# Patient Record
Sex: MALE | Race: WHITE | NOT HISPANIC OR LATINO | Employment: OTHER | URBAN - METROPOLITAN AREA
[De-identification: names, ages, dates, MRNs, and addresses within clinical notes are randomized per-mention and may not be internally consistent; named-entity substitution may affect disease eponyms.]

---

## 2017-08-23 DIAGNOSIS — Z01.810 PRE-OPERATIVE CARDIOVASCULAR EXAMINATION: ICD-10-CM

## 2017-08-23 DIAGNOSIS — Z01.812 PRE-OPERATIVE LABORATORY EXAMINATION: ICD-10-CM

## 2017-08-23 LAB
ANION GAP SERPL CALC-SCNC: 8 MMOL/L (ref 0–11.9)
BUN SERPL-MCNC: 15 MG/DL (ref 8–22)
CALCIUM SERPL-MCNC: 10.1 MG/DL (ref 8.5–10.5)
CHLORIDE SERPL-SCNC: 106 MMOL/L (ref 96–112)
CO2 SERPL-SCNC: 26 MMOL/L (ref 20–33)
CREAT SERPL-MCNC: 0.96 MG/DL (ref 0.5–1.4)
EKG IMPRESSION: NORMAL
GFR SERPL CREATININE-BSD FRML MDRD: >60 ML/MIN/1.73 M 2
GLUCOSE SERPL-MCNC: 84 MG/DL (ref 65–99)
POTASSIUM SERPL-SCNC: 3.7 MMOL/L (ref 3.6–5.5)
SODIUM SERPL-SCNC: 140 MMOL/L (ref 135–145)

## 2017-08-23 PROCEDURE — 93005 ELECTROCARDIOGRAM TRACING: CPT

## 2017-08-23 PROCEDURE — 36415 COLL VENOUS BLD VENIPUNCTURE: CPT

## 2017-08-23 PROCEDURE — 80048 BASIC METABOLIC PNL TOTAL CA: CPT

## 2017-08-23 PROCEDURE — 93010 ELECTROCARDIOGRAM REPORT: CPT | Performed by: INTERNAL MEDICINE

## 2017-08-23 RX ORDER — ATORVASTATIN CALCIUM 10 MG/1
10 TABLET, FILM COATED ORAL DAILY
COMMUNITY

## 2017-08-23 RX ORDER — IBUPROFEN 800 MG/1
800 TABLET ORAL EVERY 8 HOURS PRN
COMMUNITY

## 2017-08-23 RX ORDER — PAROXETINE HYDROCHLORIDE 40 MG/1
40 TABLET, FILM COATED ORAL DAILY
COMMUNITY

## 2017-08-23 RX ORDER — AMLODIPINE BESYLATE 10 MG/1
10 TABLET ORAL DAILY
COMMUNITY

## 2017-08-23 RX ORDER — LISINOPRIL 40 MG/1
40 TABLET ORAL DAILY
COMMUNITY

## 2017-08-23 NOTE — OR NURSING
Pre admit appt: Pt instructed to continue regularly prescribed medications through day before surgery.Per anesthesia protocol pt instructed to take these medications with a sip of water the day of surgery- amlodipine and lipitor.

## 2017-08-24 ENCOUNTER — APPOINTMENT (OUTPATIENT)
Dept: RADIOLOGY | Facility: MEDICAL CENTER | Age: 52
End: 2017-08-24
Attending: ORTHOPAEDIC SURGERY
Payer: MEDICARE

## 2017-08-24 ENCOUNTER — HOSPITAL ENCOUNTER (OUTPATIENT)
Facility: MEDICAL CENTER | Age: 52
End: 2017-08-24
Attending: ORTHOPAEDIC SURGERY | Admitting: ORTHOPAEDIC SURGERY
Payer: MEDICARE

## 2017-08-24 VITALS
DIASTOLIC BLOOD PRESSURE: 76 MMHG | RESPIRATION RATE: 16 BRPM | SYSTOLIC BLOOD PRESSURE: 117 MMHG | BODY MASS INDEX: 29 KG/M2 | HEIGHT: 69 IN | OXYGEN SATURATION: 95 % | WEIGHT: 195.77 LBS | TEMPERATURE: 98.1 F | HEART RATE: 68 BPM

## 2017-08-24 PROBLEM — S62.626A CLOSED DISPLACED FRACTURE OF MIDDLE PHALANX OF RIGHT LITTLE FINGER: Status: ACTIVE | Noted: 2017-08-24

## 2017-08-24 PROCEDURE — 160036 HCHG PACU - EA ADDL 30 MINS PHASE I: Performed by: ORTHOPAEDIC SURGERY

## 2017-08-24 PROCEDURE — C1713 ANCHOR/SCREW BN/BN,TIS/BN: HCPCS | Performed by: ORTHOPAEDIC SURGERY

## 2017-08-24 PROCEDURE — 700102 HCHG RX REV CODE 250 W/ 637 OVERRIDE(OP): Performed by: ORTHOPAEDIC SURGERY

## 2017-08-24 PROCEDURE — 160002 HCHG RECOVERY MINUTES (STAT): Performed by: ORTHOPAEDIC SURGERY

## 2017-08-24 PROCEDURE — 160009 HCHG ANES TIME/MIN: Performed by: ORTHOPAEDIC SURGERY

## 2017-08-24 PROCEDURE — 700111 HCHG RX REV CODE 636 W/ 250 OVERRIDE (IP)

## 2017-08-24 PROCEDURE — 160035 HCHG PACU - 1ST 60 MINS PHASE I: Performed by: ORTHOPAEDIC SURGERY

## 2017-08-24 PROCEDURE — 160039 HCHG SURGERY MINUTES - EA ADDL 1 MIN LEVEL 3: Performed by: ORTHOPAEDIC SURGERY

## 2017-08-24 PROCEDURE — A9270 NON-COVERED ITEM OR SERVICE: HCPCS

## 2017-08-24 PROCEDURE — 700111 HCHG RX REV CODE 636 W/ 250 OVERRIDE (IP): Performed by: ORTHOPAEDIC SURGERY

## 2017-08-24 PROCEDURE — 502576 HCHG PACK, HAND: Performed by: ORTHOPAEDIC SURGERY

## 2017-08-24 PROCEDURE — 700101 HCHG RX REV CODE 250

## 2017-08-24 PROCEDURE — 700102 HCHG RX REV CODE 250 W/ 637 OVERRIDE(OP)

## 2017-08-24 PROCEDURE — 160046 HCHG PACU - 1ST 60 MINS PHASE II: Performed by: ORTHOPAEDIC SURGERY

## 2017-08-24 PROCEDURE — 160028 HCHG SURGERY MINUTES - 1ST 30 MINS LEVEL 3: Performed by: ORTHOPAEDIC SURGERY

## 2017-08-24 PROCEDURE — 160047 HCHG PACU  - EA ADDL 30 MINS PHASE II: Performed by: ORTHOPAEDIC SURGERY

## 2017-08-24 PROCEDURE — 500088 HCHG BLADE, SAGITTAL: Performed by: ORTHOPAEDIC SURGERY

## 2017-08-24 PROCEDURE — A9270 NON-COVERED ITEM OR SERVICE: HCPCS | Performed by: ORTHOPAEDIC SURGERY

## 2017-08-24 PROCEDURE — 160048 HCHG OR STATISTICAL LEVEL 1-5: Performed by: ORTHOPAEDIC SURGERY

## 2017-08-24 PROCEDURE — 501480 HCHG STOCKINETTE: Performed by: ORTHOPAEDIC SURGERY

## 2017-08-24 PROCEDURE — 160025 RECOVERY II MINUTES (STATS): Performed by: ORTHOPAEDIC SURGERY

## 2017-08-24 PROCEDURE — 501838 HCHG SUTURE GENERAL: Performed by: ORTHOPAEDIC SURGERY

## 2017-08-24 DEVICE — IMPLANTABLE DEVICE: Type: IMPLANTABLE DEVICE | Site: LITTLE FINGER | Status: FUNCTIONAL

## 2017-08-24 RX ORDER — DEXAMETHASONE SODIUM PHOSPHATE 4 MG/ML
4 INJECTION, SOLUTION INTRA-ARTICULAR; INTRALESIONAL; INTRAMUSCULAR; INTRAVENOUS; SOFT TISSUE
Status: DISCONTINUED | OUTPATIENT
Start: 2017-08-24 | End: 2017-08-24 | Stop reason: HOSPADM

## 2017-08-24 RX ORDER — BUPIVACAINE HYDROCHLORIDE AND EPINEPHRINE 5; 5 MG/ML; UG/ML
INJECTION, SOLUTION EPIDURAL; INTRACAUDAL; PERINEURAL
Status: DISCONTINUED | OUTPATIENT
Start: 2017-08-24 | End: 2017-08-24 | Stop reason: HOSPADM

## 2017-08-24 RX ORDER — OXYCODONE HCL 5 MG/5 ML
SOLUTION, ORAL ORAL
Status: COMPLETED
Start: 2017-08-24 | End: 2017-08-24

## 2017-08-24 RX ORDER — PHENTOLAMINE MESYLATE 5 MG/1
INJECTION INTRAMUSCULAR; INTRAVENOUS
Status: DISCONTINUED | OUTPATIENT
Start: 2017-08-24 | End: 2017-08-24 | Stop reason: HOSPADM

## 2017-08-24 RX ORDER — DIPHENHYDRAMINE HYDROCHLORIDE 50 MG/ML
25 INJECTION INTRAMUSCULAR; INTRAVENOUS EVERY 6 HOURS PRN
Status: DISCONTINUED | OUTPATIENT
Start: 2017-08-24 | End: 2017-08-24 | Stop reason: HOSPADM

## 2017-08-24 RX ORDER — BUPIVACAINE HYDROCHLORIDE 5 MG/ML
INJECTION, SOLUTION EPIDURAL; INTRACAUDAL
Status: DISCONTINUED | OUTPATIENT
Start: 2017-08-24 | End: 2017-08-24 | Stop reason: HOSPADM

## 2017-08-24 RX ORDER — SCOLOPAMINE TRANSDERMAL SYSTEM 1 MG/1
1 PATCH, EXTENDED RELEASE TRANSDERMAL
Status: DISCONTINUED | OUTPATIENT
Start: 2017-08-24 | End: 2017-08-24 | Stop reason: HOSPADM

## 2017-08-24 RX ORDER — HALOPERIDOL 5 MG/ML
1 INJECTION INTRAMUSCULAR EVERY 6 HOURS PRN
Status: DISCONTINUED | OUTPATIENT
Start: 2017-08-24 | End: 2017-08-24 | Stop reason: HOSPADM

## 2017-08-24 RX ORDER — SODIUM CHLORIDE, SODIUM LACTATE, POTASSIUM CHLORIDE, CALCIUM CHLORIDE 600; 310; 30; 20 MG/100ML; MG/100ML; MG/100ML; MG/100ML
1000 INJECTION, SOLUTION INTRAVENOUS
Status: COMPLETED | OUTPATIENT
Start: 2017-08-24 | End: 2017-08-24

## 2017-08-24 RX ORDER — OXYCODONE HYDROCHLORIDE 10 MG/1
10 TABLET ORAL
Status: DISCONTINUED | OUTPATIENT
Start: 2017-08-24 | End: 2017-08-24 | Stop reason: HOSPADM

## 2017-08-24 RX ORDER — ONDANSETRON 2 MG/ML
4 INJECTION INTRAMUSCULAR; INTRAVENOUS EVERY 4 HOURS PRN
Status: DISCONTINUED | OUTPATIENT
Start: 2017-08-24 | End: 2017-08-24 | Stop reason: HOSPADM

## 2017-08-24 RX ADMIN — SODIUM CHLORIDE, SODIUM LACTATE, POTASSIUM CHLORIDE, CALCIUM CHLORIDE 1000 ML: 600; 310; 30; 20 INJECTION, SOLUTION INTRAVENOUS at 11:18

## 2017-08-24 RX ADMIN — OXYCODONE HYDROCHLORIDE 10 MG: 5 SOLUTION ORAL at 17:05

## 2017-08-24 ASSESSMENT — PAIN SCALES - GENERAL
PAINLEVEL_OUTOF10: 5
PAINLEVEL_OUTOF10: 2
PAINLEVEL_OUTOF10: 0
PAINLEVEL_OUTOF10: 5
PAINLEVEL_OUTOF10: 0
PAINLEVEL_OUTOF10: 0
PAINLEVEL_OUTOF10: 2

## 2017-08-24 NOTE — IP AVS SNAPSHOT
8/24/2017    Rashad Finleyer Saul  [address] [place] [address]    Dear Rashad:    [facility] wants to ensure your discharge home is safe and you or your loved ones have had all of your questions answered regarding your care after you leave the hospital.    Below is a list of resources and contact information should you have any questions regarding your hospital stay, follow-up instructions, or active medical symptoms.    Questions or Concerns Regarding… Contact   Medical Questions Related to Your Discharge  (7 days a week, 8am-5pm) Contact a Nurse Care Coordinator   [phone]   Medical Questions Not Related to Your Discharge  (24 hours a day / 7 days a week)  Contact the Nurse Health Line   [phone]    Medications or Discharge Instructions Refer to your discharge packet   or contact your [facility] Primary Care Provider   [phone]   Follow-up Appointment(s) Schedule your appointment via [employer]   or contact Scheduling [phone]   Billing Review your statement via [employer]  or contact Billing [phone]   Medical Records Review your records via [employer]   or contact Medical Records [phone]     You may receive a telephone call within two days of discharge. This call is to make certain you understand your discharge instructions and have the opportunity to have any questions answered. You can also easily access your medical information, test results and upcoming appointments via the [employer] free online health management tool. You can learn more and sign up at [employer]Oct[employer]. For assistance setting up your [employer] account, please call [phone].    Once again, we want to ensure your discharge home is safe and that you have a clear understanding of any next steps in your care. If you have any questions or concerns, please do not hesitate to contact us, we are here for you. Thank you for choosing [facility] for your healthcare needs.    Sincerely,    Your [facility] Healthcare Team

## 2017-08-24 NOTE — OR SURGEON
Operative Report    PreOp Diagnosis: R sf fracture dislocation pipj    PostOp Diagnosis: same    Procedure(s):  FINGER ARTHROPLASTY BOO, HAMATE, SMALL    Surgeon(s):  Milan Ventura M.D.    Anesthesiologist/Type of Anesthesia:  Anesthesiologist: Lorne Pires M.D./* No anesthesia type entered *    Surgical Staff:  Circulator: Donis Mcallister R.N.  Scrub Person: Edgardo Joaquin    Specimens:  * No specimens in log *    Estimated Blood Loss: min    Findings: comminuted fracture    Complications: none        8/24/2017 12:22 PM Milan Ventura

## 2017-08-24 NOTE — OR NURSING
1522  To PACU from OR via gurney, sleeping, respirations spontaneous and non-labored via OPA. Icepack applied over c/d/i right wrist surgical dressings. Fingers warm, however pinky finger appears dusky.  RUE elevated on pillows. Plan to keep pt in PACU for full hour per STOPBANG protocol. VSS   1535 Dr. Ventura at bedside, noted that pinky finger is dusky, but warm. No orders received.   1545 No change   1550 Dr. Ventura at bedside to remove pt dressing   1552 OPA dc's at this time   1600 Dr. Ventura at bedside to redress pts hand. VSS Pt remains drowsy   1615 Pts pinky to RUE remains dusky, VSS. Pt remains drowsy.   1630 No change   1645 Pt more awake and alert at this time. VSS. Pt states right hand is throbbing and is 5/10 pain. Plan to medicate. See MAR   1700 No change   1717  Pt meets criteria to transfer to stage two. VSS. States pain is tolerable at this time. Denies nausea.

## 2017-08-24 NOTE — IP AVS SNAPSHOT
" Home Care Instructions                                                                                                                Name:Rashad Hughes  Medical Record Number:[id number]  CSN: [id number]    YOB: 1965   Age: 52 y.o.  Sex: male  HT:1.753 m (5' 9.02\") WT: 88.8 kg (195 lb 12.3 oz)          Admit Date: 8/24/2017     Discharge Date:   Today's Date: 8/24/2017  Attending Doctor:  Milan Ventura M.D.                  Allergies:  Shellfish allergy              Follow-up Information     1. Follow up with Milan Ventura M.D..    Specialty:  Orthopaedics    Why:  10-14 days    Contact information    [address]  [address]  [facility] [address]  [phone]          2. Please follow up.    Why:  occupational therapy monday 8/28 call [phone] for apt        Discharge Instructions         ACTIVITY: Rest and take it easy for the first 24 hours.  A responsible adult is recommended to remain with you during that time.  It is normal to feel sleepy.  We encourage you to not do anything that requires balance, judgment or coordination.    MILD FLU-LIKE SYMPTOMS ARE NORMAL. YOU MAY EXPERIENCE GENERALIZED MUSCLE ACHES, THROAT IRRITATION, HEADACHE AND/OR SOME NAUSEA.    FOR 24 HOURS DO NOT:  Drive, operate machinery or run household appliances.  Drink beer or alcoholic beverages.   Make important decisions or sign legal documents.    SPECIAL INSTRUCTIONS:  Please call Dr. Ventura's office in morning. Dr. Ventura would like to see finger in the a.m.   Keep dressing clean and dry  Elevate extremity  Keep dressing on until next appointment  Encourage moving all fingers  May shower tomorrow with dressing covered     DIET: To avoid nausea, slowly advance diet as tolerated, avoiding spicy or greasy foods for the first day.  Add more substantial food to your diet according to your physician's instructions.  INCREASE FLUIDS AND FIBER TO AVOID CONSTIPATION.        FOLLOW-UP APPOINTMENT:  A follow-up appointment should be " arranged with your doctor in office as instructed; call to schedule.    You should CALL YOUR PHYSICIAN if you develop:  Fever greater than 101 degrees F.  Pain not relieved by medication, or persistent nausea or vomiting.  Excessive bleeding (blood soaking through dressing) or unexpected drainage from the wound.  Extreme redness or swelling around the incision site, drainage of pus or foul smelling drainage.  Inability to urinate or empty your bladder within 8 hours.  Problems with breathing or chest pain.    You should call 911 if you develop problems with breathing or chest pain.  If you are unable to contact your doctor or surgical center, you should go to the nearest emergency room or urgent care center.  Dr Ventura's telephone #: [phone]    If any questions arise, call your doctor.  If your doctor is not available, please feel free to call the Surgical Center at [phone].  The Center is open Monday through Friday from 7AM to 7PM.  You can also call the [employer] HOTLINE open 24 hours/day, 7 days/week and speak to a nurse at [phone], or toll free at [phone].    A registered nurse may call you a few days after your surgery to see how you are doing after your procedure.    MEDICATIONS: Resume taking daily medication.  Take prescribed pain medication with food.  If no medication is prescribed, you may take non-aspirin pain medication if needed.  PAIN MEDICATION CAN BE VERY CONSTIPATING.  Take a stool softener or laxative such as senokot, pericolace, or milk of magnesia if needed.    Prescription given for Oxycodone .  Last pain medication given at 5:00 PM 10 mg oxycodone .    If your physician has prescribed pain medication that includes Acetaminophen (Tylenol), do not take additional Acetaminophen (Tylenol) while taking the prescribed medication.    Depression / Suicide Risk    As you are discharged from this [facility] facility, it is important to learn how to keep safe from harming  yourself.    Recognize the warning signs:  · Abrupt changes in personality, positive or negative- including increase in energy   · Giving away possessions  · Change in eating patterns- significant weight changes-  positive or negative  · Change in sleeping patterns- unable to sleep or sleeping all the time   · Unwillingness or inability to communicate  · Depression  · Unusual sadness, discouragement and loneliness  · Talk of wanting to die  · Neglect of personal appearance   · Rebelliousness- reckless behavior  · Withdrawal from people/activities they love  · Confusion- inability to concentrate     If you or a loved one observes any of these behaviors or has concerns about self-harm, here's what you can do:  · Talk about it- your feelings and reasons for harming yourself  · Remove any means that you might use to hurt yourself (examples: pills, rope, extension cords, firearm)  · Get professional help from the community (Mental Health, Substance Abuse, psychological counseling)  · Do not be alone:Call your Safe Contact- someone whom you trust who will be there for you.  · Call your local CRISIS HOTLINE 406-4439 or 616-909-1639  · Call your local Children's Mobile Crisis Response Team [place] (043) 832-8869 or www.[employer]  · Call the toll free National Suicide Prevention Hotlines   · National Suicide Prevention Lifeline 389-942-VKNP (9021)  National Hope Line Network 800-SUICIDE (823-8124)    Discharge Education for patients on JOSE (Obstructive Sleep Apnea) Protocol    We recommend that you should be with an adult observer for at least 24 hours after your sedation/anesthesia.  If you have a CPAP machine, you should wear it during any sleep period (day or night) for the week following your procedure.  We encourage you to sleep on your side or in a sitting position, even with napping.  Lying flat on your back increases the risk of apnea and airway obstruction during your post procedure recovery  period.    It is important to prevent over-sedation that could increase your risk for apnea.  Please take all pain medication as directed by your physician.  If you are not getting pain relief, please contact your physician to discuss possible approaches to relieving pain while minimizing medications that can affect your breathing and oxygen levels.         Medication List      CONTINUE taking these medications        Instructions    Morning Afternoon Evening Bedtime    amlodipine 10 MG Tabs   Commonly known as:  NORVASC        Take 10 mg by mouth every day.   Dose:  10 mg                        aspirin 81 MG tablet        Take 81 mg by mouth every day.   Dose:  81 mg                        atorvastatin 10 MG Tabs   Commonly known as:  LIPITOR        Take 10 mg by mouth every day.   Dose:  10 mg                        Cholecalciferol 31586 units Tabs        Take 10,000 Units by mouth every day.   Dose:  50163 Units                        ibuprofen 800 MG Tabs   Commonly known as:  MOTRIN        Take 800 mg by mouth every 8 hours as needed.   Dose:  800 mg                        lisinopril 40 MG tablet   Commonly known as:  PRINIVIL, ZESTRIL        Take 40 mg by mouth every day.   Dose:  40 mg                        paroxetine 40 MG tablet   Commonly known as:  PAXIL        Take 40 mg by mouth every day.   Dose:  40 mg                                Medication Information     Above and/or attached are the medications your physician expects you to take upon discharge. Review all of your home medications and newly ordered medications with your doctor and/or pharmacist. Follow medication instructions as directed by your doctor and/or pharmacist. Please keep your medication list with you and share with your physician. Update the information when medications are discontinued, doses are changed, or new medications (including over-the-counter products) are added; and carry medication information at all times in the event of  emergency situations.        Resources     Quit Smoking / Tobacco Use:    I understand the use of any tobacco products increases my chance of suffering from future heart disease or stroke and could cause other illnesses which may shorten my life. Quitting the use of tobacco products is the single most important thing I can do to improve my health. For further information on smoking / tobacco cessation call a Toll Free Quit Line at 1-382.394.2546 (*National Cancer [place]) or 1-878.888.7880 (American Lung Association) or you can access the web based program at www.lungusa.org.    [place] Tobacco Users Help Line:  (851) 574-3117       Toll Free: 1-781.882.8779  Quit Tobacco Program [facility] (730)937-9280    Crisis Hotline:    [place] Crisis Hotline:  2-977-AOOQDWR or 1-356.805.7300    [place] Crisis Hotline:    1-505.706.2009 or 392-540-4837    Discharge Survey:   Thank you for choosing [facility]. We hope we did everything we could to make your hospital stay a pleasant one. You may be receiving a survey and we would appreciate your time and participation in answering the questions. Your input is very valuable to us in our efforts to improve our service to our patients and their families.            Signatures     My signature on this form indicates that:    1. I acknowledge receipt and understanding of these Home Care Instruction.  2. My questions regarding this information have been answered to my satisfaction.  3. I have formulated a plan with my discharge nurse to obtain my prescribed medications for home.    __________________________________      __________________________________                   Patient Signature                                 Guardian/Responsible Adult Signature      __________________________________                 __________       ________                       Nurse Signature                                               Date                 Time

## 2017-08-25 NOTE — DISCHARGE INSTRUCTIONS
ACTIVITY: Rest and take it easy for the first 24 hours.  A responsible adult is recommended to remain with you during that time.  It is normal to feel sleepy.  We encourage you to not do anything that requires balance, judgment or coordination.    MILD FLU-LIKE SYMPTOMS ARE NORMAL. YOU MAY EXPERIENCE GENERALIZED MUSCLE ACHES, THROAT IRRITATION, HEADACHE AND/OR SOME NAUSEA.    FOR 24 HOURS DO NOT:  Drive, operate machinery or run household appliances.  Drink beer or alcoholic beverages.   Make important decisions or sign legal documents.    SPECIAL INSTRUCTIONS:  Please call Dr. Ventura's office in morning. Dr. Ventura would like to see finger in the a.m.   Keep dressing clean and dry  Elevate extremity  Keep dressing on until next appointment  Encourage moving all fingers  May shower tomorrow with dressing covered     DIET: To avoid nausea, slowly advance diet as tolerated, avoiding spicy or greasy foods for the first day.  Add more substantial food to your diet according to your physician's instructions.  INCREASE FLUIDS AND FIBER TO AVOID CONSTIPATION.        FOLLOW-UP APPOINTMENT:  A follow-up appointment should be arranged with your doctor in office as instructed; call to schedule.    You should CALL YOUR PHYSICIAN if you develop:  Fever greater than 101 degrees F.  Pain not relieved by medication, or persistent nausea or vomiting.  Excessive bleeding (blood soaking through dressing) or unexpected drainage from the wound.  Extreme redness or swelling around the incision site, drainage of pus or foul smelling drainage.  Inability to urinate or empty your bladder within 8 hours.  Problems with breathing or chest pain.    You should call 911 if you develop problems with breathing or chest pain.  If you are unable to contact your doctor or surgical center, you should go to the nearest emergency room or urgent care center.  Dr Ventura's telephone #: [phone]    If any questions arise, call your doctor.  If your doctor  is not available, please feel free to call the Surgical Center at [phone].  The Center is open Monday through Friday from 7AM to 7PM.  You can also call the HEALTH HOTLINE open 24 hours/day, 7 days/week and speak to a nurse at [phone], or toll free at [phone].    A registered nurse may call you a few days after your surgery to see how you are doing after your procedure.    MEDICATIONS: Resume taking daily medication.  Take prescribed pain medication with food.  If no medication is prescribed, you may take non-aspirin pain medication if needed.  PAIN MEDICATION CAN BE VERY CONSTIPATING.  Take a stool softener or laxative such as senokot, pericolace, or milk of magnesia if needed.    Prescription given for Oxycodone .  Last pain medication given at 5:00 PM 10 mg oxycodone .    If your physician has prescribed pain medication that includes Acetaminophen (Tylenol), do not take additional Acetaminophen (Tylenol) while taking the prescribed medication.    Depression / Suicide Risk    As you are discharged from this [facility] facility, it is important to learn how to keep safe from harming yourself.    Recognize the warning signs:  · Abrupt changes in personality, positive or negative- including increase in energy   · Giving away possessions  · Change in eating patterns- significant weight changes-  positive or negative  · Change in sleeping patterns- unable to sleep or sleeping all the time   · Unwillingness or inability to communicate  · Depression  · Unusual sadness, discouragement and loneliness  · Talk of wanting to die  · Neglect of personal appearance   · Rebelliousness- reckless behavior  · Withdrawal from people/activities they love  · Confusion- inability to concentrate     If you or a loved one observes any of these behaviors or has concerns about self-harm, here's what you can do:  · Talk about it- your feelings and reasons for harming yourself  · Remove any means that you might use to  hurt yourself (examples: pills, rope, extension cords, firearm)  · Get professional help from the community (Mental Health, Substance Abuse, psychological counseling)  · Do not be alone:Call your Safe Contact- someone whom you trust who will be there for you.  · Call your local CRISIS HOTLINE 606-0009 or 330-171-8006  · Call your local Children's Mobile Crisis Response Team [place] (709) 987-6393 or www.[employer]  · Call the toll free National Suicide Prevention Hotlines   · National Suicide Prevention Lifeline 082-722-BMQE (1505)  [place] Hope Line Network 800-SUICIDE (043-5645)    Discharge Education for patients on JOSE (Obstructive Sleep Apnea) Protocol    We recommend that you should be with an adult observer for at least 24 hours after your sedation/anesthesia.  If you have a CPAP machine, you should wear it during any sleep period (day or night) for the week following your procedure.  We encourage you to sleep on your side or in a sitting position, even with napping.  Lying flat on your back increases the risk of apnea and airway obstruction during your post procedure recovery period.    It is important to prevent over-sedation that could increase your risk for apnea.  Please take all pain medication as directed by your physician.  If you are not getting pain relief, please contact your physician to discuss possible approaches to relieving pain while minimizing medications that can affect your breathing and oxygen levels.

## 2017-08-25 NOTE — OR NURSING
1717: Settled in recliner post short ambulation from [facility].  1800: D/Wood to care of family post uneventful stay in PACU 2.
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
## 2017-09-11 ENCOUNTER — APPOINTMENT (OUTPATIENT)
Dept: ADMISSIONS | Facility: MEDICAL CENTER | Age: 52
End: 2017-09-11
Attending: ORTHOPAEDIC SURGERY
Payer: MEDICARE

## 2018-03-28 DIAGNOSIS — Z01.810 PRE-OPERATIVE CARDIOVASCULAR EXAMINATION: ICD-10-CM

## 2018-03-28 DIAGNOSIS — Z01.812 PRE-OPERATIVE LABORATORY EXAMINATION: ICD-10-CM

## 2018-03-28 LAB
ANION GAP SERPL CALC-SCNC: 9 MMOL/L (ref 0–11.9)
BUN SERPL-MCNC: 16 MG/DL (ref 8–22)
CALCIUM SERPL-MCNC: 10 MG/DL (ref 8.5–10.5)
CHLORIDE SERPL-SCNC: 106 MMOL/L (ref 96–112)
CO2 SERPL-SCNC: 22 MMOL/L (ref 20–33)
CREAT SERPL-MCNC: 0.65 MG/DL (ref 0.5–1.4)
EKG IMPRESSION: NORMAL
ERYTHROCYTE [DISTWIDTH] IN BLOOD BY AUTOMATED COUNT: 42.4 FL (ref 35.9–50)
GLUCOSE SERPL-MCNC: 86 MG/DL (ref 65–99)
HCT VFR BLD AUTO: 46.2 % (ref 42–52)
HGB BLD-MCNC: 15.8 G/DL (ref 14–18)
MCH RBC QN AUTO: 30.6 PG (ref 27–33)
MCHC RBC AUTO-ENTMCNC: 34.2 G/DL (ref 33.7–35.3)
MCV RBC AUTO: 89.5 FL (ref 81.4–97.8)
PLATELET # BLD AUTO: 259 K/UL (ref 164–446)
PMV BLD AUTO: 12.2 FL (ref 9–12.9)
POTASSIUM SERPL-SCNC: 3.9 MMOL/L (ref 3.6–5.5)
RBC # BLD AUTO: 5.16 M/UL (ref 4.7–6.1)
SCCMEC + MECA PNL NOSE NAA+PROBE: NEGATIVE
SCCMEC + MECA PNL NOSE NAA+PROBE: NEGATIVE
SODIUM SERPL-SCNC: 137 MMOL/L (ref 135–145)
WBC # BLD AUTO: 6 K/UL (ref 4.8–10.8)

## 2018-03-28 PROCEDURE — 85027 COMPLETE CBC AUTOMATED: CPT

## 2018-03-28 PROCEDURE — 87640 STAPH A DNA AMP PROBE: CPT | Mod: 59

## 2018-03-28 PROCEDURE — 36415 COLL VENOUS BLD VENIPUNCTURE: CPT

## 2018-03-28 PROCEDURE — 93005 ELECTROCARDIOGRAM TRACING: CPT

## 2018-03-28 PROCEDURE — 80048 BASIC METABOLIC PNL TOTAL CA: CPT

## 2018-03-28 PROCEDURE — 93010 ELECTROCARDIOGRAM REPORT: CPT | Performed by: INTERNAL MEDICINE

## 2018-03-28 PROCEDURE — 87641 MR-STAPH DNA AMP PROBE: CPT

## 2018-03-28 NOTE — DISCHARGE PLANNING
DISCHARGE PLANNING NOTE - TOTAL JOINT     Procedure: Procedure(s):  KNEE ARTHROPLASTY TOTAL - PATELLOFEMORAL  Procedure Date: 4/12/2018  Insurance:  Payor: MEDICARE / Plan: MEDICARE PART A & B / Product Type: *No Product type* /   Equipment currently available at home? raised toilet seat and shower chair  Steps into the home? 0  Steps within the home? 0  Toilet height? Standard  Type of shower? walk-in shower  Who will be with you during your recovery? friend  Is Outpatient Physical Therapy set up after surgery? Yes   Did you take the Total Joint Class and where? Yes, at [facility]     Plan:

## 2018-03-28 NOTE — OR NURSING
Pre admit apt: Pt. Instructed to continue regularly prescribed medications through day before surgery.  Instructed to take the following medications, the day of surgery, with a sip of water per anesthesia protocol:amlodipine, lipitor    Pt denies any sxs of UTI

## 2018-04-12 ENCOUNTER — HOSPITAL ENCOUNTER (OUTPATIENT)
Facility: MEDICAL CENTER | Age: 53
End: 2018-04-12
Attending: ORTHOPAEDIC SURGERY | Admitting: ORTHOPAEDIC SURGERY
Payer: MEDICARE

## 2018-04-12 ENCOUNTER — APPOINTMENT (OUTPATIENT)
Dept: RADIOLOGY | Facility: MEDICAL CENTER | Age: 53
End: 2018-04-12
Attending: PHYSICIAN ASSISTANT
Payer: MEDICARE

## 2018-04-12 VITALS
TEMPERATURE: 98.6 F | RESPIRATION RATE: 18 BRPM | WEIGHT: 189.6 LBS | DIASTOLIC BLOOD PRESSURE: 97 MMHG | HEIGHT: 69 IN | SYSTOLIC BLOOD PRESSURE: 139 MMHG | BODY MASS INDEX: 28.08 KG/M2 | HEART RATE: 103 BPM | OXYGEN SATURATION: 97 %

## 2018-04-12 PROCEDURE — 700111 HCHG RX REV CODE 636 W/ 250 OVERRIDE (IP)

## 2018-04-12 PROCEDURE — 160009 HCHG ANES TIME/MIN: Performed by: ORTHOPAEDIC SURGERY

## 2018-04-12 PROCEDURE — 502579 HCHG PACK, TOTAL KNEE: Performed by: ORTHOPAEDIC SURGERY

## 2018-04-12 PROCEDURE — A9270 NON-COVERED ITEM OR SERVICE: HCPCS

## 2018-04-12 PROCEDURE — 160041 HCHG SURGERY MINUTES - EA ADDL 1 MIN LEVEL 4: Performed by: ORTHOPAEDIC SURGERY

## 2018-04-12 PROCEDURE — 160046 HCHG PACU - 1ST 60 MINS PHASE II: Performed by: ORTHOPAEDIC SURGERY

## 2018-04-12 PROCEDURE — 160029 HCHG SURGERY MINUTES - 1ST 30 MINS LEVEL 4: Performed by: ORTHOPAEDIC SURGERY

## 2018-04-12 PROCEDURE — 700101 HCHG RX REV CODE 250

## 2018-04-12 PROCEDURE — 160022 HCHG BLOCK: Performed by: ORTHOPAEDIC SURGERY

## 2018-04-12 PROCEDURE — 501838 HCHG SUTURE GENERAL: Performed by: ORTHOPAEDIC SURGERY

## 2018-04-12 PROCEDURE — 700102 HCHG RX REV CODE 250 W/ 637 OVERRIDE(OP)

## 2018-04-12 PROCEDURE — L8699 PROSTHETIC IMPLANT NOS: HCPCS | Performed by: ORTHOPAEDIC SURGERY

## 2018-04-12 PROCEDURE — 160048 HCHG OR STATISTICAL LEVEL 1-5: Performed by: ORTHOPAEDIC SURGERY

## 2018-04-12 PROCEDURE — 160047 HCHG PACU  - EA ADDL 30 MINS PHASE II: Performed by: ORTHOPAEDIC SURGERY

## 2018-04-12 PROCEDURE — 502000 HCHG MISC OR IMPLANTS RC 0278: Performed by: ORTHOPAEDIC SURGERY

## 2018-04-12 PROCEDURE — 160002 HCHG RECOVERY MINUTES (STAT): Performed by: ORTHOPAEDIC SURGERY

## 2018-04-12 PROCEDURE — 160035 HCHG PACU - 1ST 60 MINS PHASE I: Performed by: ORTHOPAEDIC SURGERY

## 2018-04-12 PROCEDURE — 160025 RECOVERY II MINUTES (STATS): Performed by: ORTHOPAEDIC SURGERY

## 2018-04-12 PROCEDURE — 160036 HCHG PACU - EA ADDL 30 MINS PHASE I: Performed by: ORTHOPAEDIC SURGERY

## 2018-04-12 PROCEDURE — 73560 X-RAY EXAM OF KNEE 1 OR 2: CPT | Mod: LT

## 2018-04-12 PROCEDURE — 700105 HCHG RX REV CODE 258: Performed by: ORTHOPAEDIC SURGERY

## 2018-04-12 DEVICE — BONE CEMENT SIMPLEX ANTIBIO - (10/PK): Type: IMPLANTABLE DEVICE | Site: KNEE | Status: FUNCTIONAL

## 2018-04-12 DEVICE — IMPLANTABLE DEVICE: Type: IMPLANTABLE DEVICE | Site: KNEE | Status: FUNCTIONAL

## 2018-04-12 RX ORDER — LIDOCAINE HYDROCHLORIDE 10 MG/ML
INJECTION, SOLUTION INFILTRATION; PERINEURAL
Status: COMPLETED
Start: 2018-04-12 | End: 2018-04-12

## 2018-04-12 RX ORDER — SODIUM CHLORIDE, SODIUM LACTATE, POTASSIUM CHLORIDE, CALCIUM CHLORIDE 600; 310; 30; 20 MG/100ML; MG/100ML; MG/100ML; MG/100ML
INJECTION, SOLUTION INTRAVENOUS
Status: DISCONTINUED | OUTPATIENT
Start: 2018-04-12 | End: 2018-04-12 | Stop reason: HOSPADM

## 2018-04-12 RX ORDER — LABETALOL HYDROCHLORIDE 5 MG/ML
INJECTION, SOLUTION INTRAVENOUS
Status: COMPLETED
Start: 2018-04-12 | End: 2018-04-12

## 2018-04-12 RX ORDER — OXYCODONE HCL 5 MG/5 ML
SOLUTION, ORAL ORAL
Status: COMPLETED
Start: 2018-04-12 | End: 2018-04-12

## 2018-04-12 RX ADMIN — FENTANYL CITRATE 50 MCG: 50 INJECTION, SOLUTION INTRAMUSCULAR; INTRAVENOUS at 14:14

## 2018-04-12 RX ADMIN — FENTANYL CITRATE 25 MCG: 50 INJECTION, SOLUTION INTRAMUSCULAR; INTRAVENOUS at 14:49

## 2018-04-12 RX ADMIN — LABETALOL HYDROCHLORIDE 5 MG: 5 INJECTION, SOLUTION INTRAVENOUS at 14:45

## 2018-04-12 RX ADMIN — SODIUM CHLORIDE, POTASSIUM CHLORIDE, SODIUM LACTATE AND CALCIUM CHLORIDE: 600; 310; 30; 20 INJECTION, SOLUTION INTRAVENOUS at 11:00

## 2018-04-12 RX ADMIN — FENTANYL CITRATE 25 MCG: 50 INJECTION, SOLUTION INTRAMUSCULAR; INTRAVENOUS at 15:27

## 2018-04-12 RX ADMIN — OXYCODONE HYDROCHLORIDE 10 MG: 5 SOLUTION ORAL at 14:12

## 2018-04-12 RX ADMIN — LIDOCAINE HYDROCHLORIDE 0.2 ML: 10 INJECTION, SOLUTION INFILTRATION; PERINEURAL at 11:00

## 2018-04-12 RX ADMIN — FENTANYL CITRATE 25 MCG: 50 INJECTION, SOLUTION INTRAMUSCULAR; INTRAVENOUS at 15:22

## 2018-04-12 RX ADMIN — FENTANYL CITRATE 25 MCG: 50 INJECTION, SOLUTION INTRAMUSCULAR; INTRAVENOUS at 14:56

## 2018-04-12 ASSESSMENT — PAIN SCALES - GENERAL
PAINLEVEL_OUTOF10: 4
PAINLEVEL_OUTOF10: 7
PAINLEVEL_OUTOF10: 8
PAINLEVEL_OUTOF10: ASSUMED PAIN PRESENT
PAINLEVEL_OUTOF10: 4
PAINLEVEL_OUTOF10: ASSUMED PAIN PRESENT
PAINLEVEL_OUTOF10: 4

## 2018-04-12 NOTE — OP REPORT
DATE OF SERVICE:  04/12/2018    PREOPERATIVE DIAGNOSES:  Left knee degenerative joint disease and extensive   scarring of the retropatellar fat pad.    POSTOPERATIVE DIAGNOSES:  1.  Left knee degenerative joint disease of the patellofemoral joint.  2.  Extensive scarring of the retropatellar fat pad and the retinacula.  3.  Lateral maltracking of the patella and additional procedure of open   lateral release.    PROCEDURES PERFORMED:  1.  Left knee patellofemoral osteoplasty.  2.  Extensive scar excision from the anterior aspect of the knee.    SURGEON:  Jarred Rene MD    ASSISTANT:  Bev Escobar PA-C    ANESTHESIA:  General and an adductor canal nerve block.    ANESTHESIOLOGIST:  Bharath Salazar MD    IMPLANTS:  [employer] Journey patellofemoral joint Oxinium medium   trochlear implant and a 35x7.5 mm thickness Loren II resurfacing round   patellar component.    TOTAL TOURNIQUET TIME:  Approximately 45 minutes.    COMPLICATIONS:  None.    DISPOSITION:  Stable to postanesthesia care unit.    INDICATIONS:  The patient is a very pleasant gentleman who has had multiple   procedures on the left knee over the years.  He has had significant scarring   and advanced patellofemoral degenerative changes.  The risks, benefits,   alternatives, and limitations of the patellofemoral arthroplasty and other   procedures indicated were discussed in detail.  He expressed understanding and   desired to proceed.    DESCRIPTION OF PROCEDURE:  The patient and the correct operative extremity   were identified in the preoperative area.  The knee was marked.  He was   brought to the operating room and the correct operative extremity was again   confirmed.  He was placed supine on the OR table where he underwent an   adductor canal nerve block performed at my request for postoperative pain   control.  He underwent general anesthesia without complication.  Examination   under anesthesia showed diminished patellofemoral mobility,  but otherwise no   abnormalities.  Full range of motion, no instability.  The knee was prepped   and draped in the usual sterile fashion using ChloraPrep and Esmarch used to   exsanguinate the left lower extremity and tourniquet inflated to 250 mmHg.  A   10 cm longitudinal incision was centered over the patellofemoral joint.  Sharp   dissection carried down to the level of the paratenon and medial parapatellar   arthrotomy was performed, taking care to avoid incising the inner meniscal   ligament inferiorly and then incised into the synovial layer.  There was   extensive scarring around the retropatellar fat pad, just thick scar there,   developed a plane between the patellar tendon and the scar and with a curved   Han scissors developed that plane and then excised the scar from the   retropatellar fat pad, then mobilized more scar laterally along the   retinaculum, eventually performed an open lateral release from the inside of   the knee.  I then evaluated the articular cartilage.  There are extensive   grade IV changes on the trochlear side.  The patellar side still had some   intact cartilage, but it was significantly thin.  I elected to proceed with   patellofemoral arthroplasty as the tibial femoral joints were intact, the ACL   and PCL were intact, and placed the intramedullary guide lined up the proposed   resection with the intercondylar axis.  I then performed anterior femoral   resection, then just used a knife to remove the cartilage from the trochlea   and a curette and then used a small joce to bur that down until the trial fit   perfectly flush with the remaining articular cartilage.  We then drilled for   the lugs for the real component and then placed a trial in position, again had   perfect transition between the trial and the articular cartilage of both the   medial femoral condyle and lateral femoral condyle.  I then everted the   patella, excised more scar from around the patella, then  measured the patella   about 25 mm and performed a freehand patellar resection and then placed a 35   mm round patellar button, had central tracking, measured 26 mm, so we elected   to go with thinner poly of 2 mm so that would make the polyethylene and   patellar thickness 24 mm.  We then removed the trials, vacuum irrigated and   dried the bony surfaces while the cement was vacuum mixed.  We then cemented   the trochlear component followed by the patella using third generation   technique.  We allowed the cement to cure completely while bathing in a   Betadine solution.  We removed all excess cement, again copiously irrigated   the wound, then deflated the tourniquet, obtained hemostasis.  There was   minimal bleeding.  We then again irrigated the wound, took the knee through   full range of motion.  There was central patellar tracking, no significant   step-offs or jumping as the patella was taken through the full range of motion   and then flexed the knee to about 45 degrees, closed the extensor mechanism   with combination of interrupted #1 Vicryl suture and a running #2 Quill stitch   and again copiously irrigated the wound, closed the deep subcutaneous with   2-0 Monocryl, closed the skin with staples.  Sterile dressings were applied.    The patient's knee was loosely overwrapped with an Ace wrap.  SERGIO stocking was   placed.  The patient was then allowed to awake from anesthesia, transferred   to his hospital cart, and taken to postanesthesia care unit in stable   condition.  He tolerated the procedure well.  There were no immediate   complications.       ____________________________________     SPENCER MICHELE MD RD / ANALIA    DD:  04/12/2018 13:49:25  DT:  04/12/2018 14:09:46    D#:  [id number]  Job#:  [id number]

## 2018-04-12 NOTE — DISCHARGE INSTRUCTIONS
ACTIVITY: Rest and take it easy for the first 24 hours.  A responsible adult is recommended to remain with you during that time.  It is normal to feel sleepy.  We encourage you to not do anything that requires balance, judgment or coordination.    MILD FLU-LIKE SYMPTOMS ARE NORMAL. YOU MAY EXPERIENCE GENERALIZED MUSCLE ACHES, THROAT IRRITATION, HEADACHE AND/OR SOME NAUSEA.    FOR 24 HOURS DO NOT:  Drive, operate machinery or run household appliances.  Drink beer or alcoholic beverages.   Make important decisions or sign legal documents.    SPECIAL INSTRUCTIONS: Activity is to be non weight bearing until block wears off (approximately 24 hours). Then weight bearing as tolerated. Ice and elevate extremity.    DIET: To avoid nausea, slowly advance diet as tolerated, avoiding spicy or greasy foods for the first day.  Add more substantial food to your diet according to your physician's instructions.  Babies can be fed formula or breast milk as soon as they are hungry.  INCREASE FLUIDS AND FIBER TO AVOID CONSTIPATION.    SURGICAL DRESSING/BATHING: Keep dressings clean and dry. May remove rob hose and ace wrap and shower on Saturday. KEEP SILVER DRESSING IN PLACE UNTIL FIRST POST OP VISIT. DO NOT SUBMERGE INCISIONS FOR 3 WEEKS.  Re-wrap with ace wrap after shower. Ice and elevate extremity.    FOLLOW-UP APPOINTMENT: As scheduled.      You should CALL YOUR PHYSICIAN if you develop:  Fever greater than 101 degrees F.  Pain not relieved by medication, or persistent nausea or vomiting.  Excessive bleeding (blood soaking through dressing) or unexpected drainage from the wound.  Extreme redness or swelling around the incision site, drainage of pus or foul smelling drainage.  Inability to urinate or empty your bladder within 8 hours.    You should call 911 if you develop problems with breathing or chest pain.    If you are unable to contact your doctor or surgical center, you should go to the nearest emergency room or urgent  [facility] center.      Physician's telephone #: Dr. Rene [phone]    If any questions arise, call your doctor.  If your doctor is not available, please feel free to call the Surgical Center at [phone].  The Center is open Monday through Friday from 7AM to 7PM.      You can also call the HEALTH HOTLINE open 24 hours/day, 7 days/week and speak to a nurse at [phone], or toll free at [phone].    A registered nurse may call you a few days after your surgery to see how you are doing after your procedure.    MEDICATIONS: Resume taking daily medication.  Take prescribed pain medication with food.  If no medication is prescribed, you may take non-aspirin pain medication if needed.  PAIN MEDICATION CAN BE VERY CONSTIPATING.  Take a stool softener or laxative such as senokot, pericolace, or milk of magnesia if needed.    Prescription at home.      Last pain medication given at 2:15 pm.    If your physician has prescribed pain medication that includes Acetaminophen (Tylenol), do not take additional Acetaminophen (Tylenol) while taking the prescribed medication.    Peripheral Nerve Block Discharge Instructions from Same Day Surgery and Inpatient :    What to Expect - Lower Extremity  · The block may cause you to experience numbness and weakness in your hip and thigh or thigh and knee on the same side as your surgery  · Numbness, tingling and / or weakness are all normal. For some people, this may be an unpleasant sensation  · These issues will be resolved when the local anesthetic wears off   · You may experience numbness and tingling in your thigh on the same side as your surgery if the block medicine was injected at your groin area  · Numbness will make it difficult to walk  · You may have problems with balance and walking so be very careful   · Follow your surgeon's direction regarding weight bearing on your surgical limb  · Be very careful with your numb limb  Precautions  · The numbness may affect your  "balance  · Be careful when walking or moving around  · Your leg may be weak: be very careful putting weight on it  · If your surgeon did not specify a time, you should not bear weight for 24 hours  · Be sure to ask for help when you need it  · It is better to have help than to fall and hurt yourself  Prevent Injury  · Protect the limb like a baby  · Beware of exposing your limb to extreme heat or cold or trauma  · The limb may be injured without you noticing because it is numb  · Keep the limb elevated whenever possible  · Do not sleep on the limb  · Change the position of the limb regularly  · Avoid putting pressure on your surgical limb  Pain Control  · The initial block on the day of surgery will make your extremity feel \"numb\"  · Any consecutive injection including prior to discharge from the hospital will make your extremity feel \"numb\"  · You may feel an aching or burning when the local anesthesia starts to wear off  · Take pain pills as prescribed by your surgeon  · Call your surgeon or anesthesiologist if you do not have adequate pain control    Depression / Suicide Risk    As you are discharged from this [facility] facility, it is important to learn how to keep safe from harming yourself.    Recognize the warning signs:  · Abrupt changes in personality, positive or negative- including increase in energy   · Giving away possessions  · Change in eating patterns- significant weight changes-  positive or negative  · Change in sleeping patterns- unable to sleep or sleeping all the time   · Unwillingness or inability to communicate  · Depression  · Unusual sadness, discouragement and loneliness  · Talk of wanting to die  · Neglect of personal appearance   · Rebelliousness- reckless behavior  · Withdrawal from people/activities they love  · Confusion- inability to concentrate     If you or a loved one observes any of these behaviors or has concerns about self-harm, here's what you can do:  · Talk about it- your " feelings and reasons for harming yourself  · Remove any means that you might use to hurt yourself (examples: pills, rope, extension cords, firearm)  · Get professional help from the community (Mental Health, Substance Abuse, psychological counseling)  · Do not be alone:Call your Safe Contact- someone whom you trust who will be there for you.  · Call your local CRISIS HOTLINE 555-0308 or 636-370-5640  · Call your local Children's Mobile Crisis Response Team [place] (882) 911-8033 or www.Hittahem  · Call the toll free National Suicide Prevention Hotlines   · National Suicide Prevention Lifeline 245-476-DPND (3796)  National Hope Line Network 800-SUICIDE (138-5273)    ·

## 2018-04-12 NOTE — OR NURSING
1342: To PACU post left patella femoral arthroplasty w/ femoral adductor canal block. OPA in place. Strong pulse noted.  1407: OPA dc'd, breathing is spontaneous and unlabored.  1410: Pt c/o extreme pain. See MAR.  1450: Pt was sleeping, but now awake and c/o pain, will continue to medicate.  1530: Pt states pain is tolerable, no nausea. Xray complete. Meets criteria for stage ll.

## 2018-04-12 NOTE — OR NURSING
1010  PT TO PRE OP TO ASSUME CARE.  1100 Patient allergies and NPO status verified, home medication reconciliation completed and belongings secured. Patient verbalizes understanding of pain scale, expected course of stay and plan of care. Surgical site verified with patient. IV access established. Sequentials placed on legs

## 2018-04-13 NOTE — PROGRESS NOTES
1554 patient to stage 2  Patient settled in recliner chair post short ambulation from [facility] - pt dressed with assist by CNA. Dressing CDI to L knee with ice pack in place. +2 L pedal pulse, pink/warm toes and <3 sec cap refill. Matthew hose to Bilat lower legs. Pt reports minimal pain post nerve block and tolerable and denies nausea.   1653 Pt up and ambulated with crutches from home and demonstrated safe ambulation and transfer with crutches 15 feet.   1700 Pt remains awake without stimulation. Pain rated as tolerable and denies nausea. D/Wood to care of family post uneventful stay in PACU 2.

## 2020-01-03 ENCOUNTER — APPOINTMENT (OUTPATIENT)
Dept: RADIOLOGY | Facility: MEDICAL CENTER | Age: 55
End: 2020-01-03
Attending: EMERGENCY MEDICINE
Payer: MEDICARE

## 2020-01-03 ENCOUNTER — HOSPITAL ENCOUNTER (EMERGENCY)
Facility: MEDICAL CENTER | Age: 55
End: 2020-01-03
Attending: EMERGENCY MEDICINE
Payer: MEDICARE

## 2020-01-03 VITALS
HEART RATE: 88 BPM | BODY MASS INDEX: 28.08 KG/M2 | WEIGHT: 189.6 LBS | TEMPERATURE: 98.5 F | OXYGEN SATURATION: 94 % | RESPIRATION RATE: 16 BRPM | DIASTOLIC BLOOD PRESSURE: 82 MMHG | HEIGHT: 69 IN | SYSTOLIC BLOOD PRESSURE: 142 MMHG

## 2020-01-03 DIAGNOSIS — R20.0 FACIAL NUMBNESS: ICD-10-CM

## 2020-01-03 LAB
ABO GROUP BLD: NORMAL
ALBUMIN SERPL BCP-MCNC: 4.4 G/DL (ref 3.2–4.9)
ALBUMIN/GLOB SERPL: 1.6 G/DL
ALP SERPL-CCNC: 75 U/L (ref 30–99)
ALT SERPL-CCNC: 26 U/L (ref 2–50)
ANION GAP SERPL CALC-SCNC: 11 MMOL/L (ref 0–11.9)
APTT PPP: 23.1 SEC (ref 24.7–36)
AST SERPL-CCNC: 16 U/L (ref 12–45)
BASOPHILS # BLD AUTO: 0.6 % (ref 0–1.8)
BASOPHILS # BLD: 0.04 K/UL (ref 0–0.12)
BILIRUB SERPL-MCNC: 0.6 MG/DL (ref 0.1–1.5)
BLD GP AB SCN SERPL QL: NORMAL
BUN SERPL-MCNC: 12 MG/DL (ref 8–22)
CALCIUM SERPL-MCNC: 9.5 MG/DL (ref 8.5–10.5)
CHLORIDE SERPL-SCNC: 104 MMOL/L (ref 96–112)
CO2 SERPL-SCNC: 22 MMOL/L (ref 20–33)
CREAT SERPL-MCNC: 0.76 MG/DL (ref 0.5–1.4)
EKG IMPRESSION: NORMAL
EOSINOPHIL # BLD AUTO: 0.14 K/UL (ref 0–0.51)
EOSINOPHIL NFR BLD: 2.1 % (ref 0–6.9)
ERYTHROCYTE [DISTWIDTH] IN BLOOD BY AUTOMATED COUNT: 41.1 FL (ref 35.9–50)
GLOBULIN SER CALC-MCNC: 2.8 G/DL (ref 1.9–3.5)
GLUCOSE SERPL-MCNC: 118 MG/DL (ref 65–99)
HCT VFR BLD AUTO: 46.9 % (ref 42–52)
HGB BLD-MCNC: 16.3 G/DL (ref 14–18)
IMM GRANULOCYTES # BLD AUTO: 0.03 K/UL (ref 0–0.11)
IMM GRANULOCYTES NFR BLD AUTO: 0.4 % (ref 0–0.9)
INR PPP: 0.93 (ref 0.87–1.13)
LYMPHOCYTES # BLD AUTO: 2.05 K/UL (ref 1–4.8)
LYMPHOCYTES NFR BLD: 30.6 % (ref 22–41)
MCH RBC QN AUTO: 30.9 PG (ref 27–33)
MCHC RBC AUTO-ENTMCNC: 34.8 G/DL (ref 33.7–35.3)
MCV RBC AUTO: 88.8 FL (ref 81.4–97.8)
MONOCYTES # BLD AUTO: 0.42 K/UL (ref 0–0.85)
MONOCYTES NFR BLD AUTO: 6.3 % (ref 0–13.4)
NEUTROPHILS # BLD AUTO: 4.01 K/UL (ref 1.82–7.42)
NEUTROPHILS NFR BLD: 60 % (ref 44–72)
NRBC # BLD AUTO: 0 K/UL
NRBC BLD-RTO: 0 /100 WBC
PLATELET # BLD AUTO: 244 K/UL (ref 164–446)
PMV BLD AUTO: 11.2 FL (ref 9–12.9)
POTASSIUM SERPL-SCNC: 3.4 MMOL/L (ref 3.6–5.5)
PROT SERPL-MCNC: 7.2 G/DL (ref 6–8.2)
PROTHROMBIN TIME: 12.7 SEC (ref 12–14.6)
RBC # BLD AUTO: 5.28 M/UL (ref 4.7–6.1)
RH BLD: NORMAL
SODIUM SERPL-SCNC: 137 MMOL/L (ref 135–145)
TROPONIN T SERPL-MCNC: <6 NG/L (ref 6–19)
WBC # BLD AUTO: 6.7 K/UL (ref 4.8–10.8)

## 2020-01-03 PROCEDURE — 0042T CT-CEREBRAL PERFUSION ANALYSIS: CPT

## 2020-01-03 PROCEDURE — 85025 COMPLETE CBC W/AUTO DIFF WBC: CPT

## 2020-01-03 PROCEDURE — 99285 EMERGENCY DEPT VISIT HI MDM: CPT

## 2020-01-03 PROCEDURE — 99284 EMERGENCY DEPT VISIT MOD MDM: CPT | Performed by: PSYCHIATRY & NEUROLOGY

## 2020-01-03 PROCEDURE — 86900 BLOOD TYPING SEROLOGIC ABO: CPT

## 2020-01-03 PROCEDURE — 85730 THROMBOPLASTIN TIME PARTIAL: CPT

## 2020-01-03 PROCEDURE — 86850 RBC ANTIBODY SCREEN: CPT

## 2020-01-03 PROCEDURE — 96374 THER/PROPH/DIAG INJ IV PUSH: CPT

## 2020-01-03 PROCEDURE — 93005 ELECTROCARDIOGRAM TRACING: CPT | Performed by: EMERGENCY MEDICINE

## 2020-01-03 PROCEDURE — 70551 MRI BRAIN STEM W/O DYE: CPT

## 2020-01-03 PROCEDURE — 80053 COMPREHEN METABOLIC PANEL: CPT

## 2020-01-03 PROCEDURE — 700117 HCHG RX CONTRAST REV CODE 255

## 2020-01-03 PROCEDURE — 70496 CT ANGIOGRAPHY HEAD: CPT

## 2020-01-03 PROCEDURE — 700111 HCHG RX REV CODE 636 W/ 250 OVERRIDE (IP): Performed by: EMERGENCY MEDICINE

## 2020-01-03 PROCEDURE — 70498 CT ANGIOGRAPHY NECK: CPT

## 2020-01-03 PROCEDURE — 71045 X-RAY EXAM CHEST 1 VIEW: CPT

## 2020-01-03 PROCEDURE — 70450 CT HEAD/BRAIN W/O DYE: CPT

## 2020-01-03 PROCEDURE — 94760 N-INVAS EAR/PLS OXIMETRY 1: CPT

## 2020-01-03 PROCEDURE — 84484 ASSAY OF TROPONIN QUANT: CPT

## 2020-01-03 PROCEDURE — 85610 PROTHROMBIN TIME: CPT

## 2020-01-03 PROCEDURE — 86901 BLOOD TYPING SEROLOGIC RH(D): CPT

## 2020-01-03 RX ORDER — LORATADINE 10 MG/1
10 TABLET ORAL DAILY
COMMUNITY
Start: 2019-12-20

## 2020-01-03 RX ORDER — KETOROLAC TROMETHAMINE 30 MG/ML
15 INJECTION, SOLUTION INTRAMUSCULAR; INTRAVENOUS ONCE
Status: COMPLETED | OUTPATIENT
Start: 2020-01-03 | End: 2020-01-03

## 2020-01-03 RX ADMIN — KETOROLAC TROMETHAMINE 15 MG: 30 INJECTION, SOLUTION INTRAMUSCULAR; INTRAVENOUS at 16:47

## 2020-01-03 RX ADMIN — IOHEXOL 40 ML: 350 INJECTION, SOLUTION INTRAVENOUS at 15:55

## 2020-01-03 RX ADMIN — IOHEXOL 80 ML: 350 INJECTION, SOLUTION INTRAVENOUS at 15:56

## 2020-01-03 ASSESSMENT — LIFESTYLE VARIABLES: DO YOU DRINK ALCOHOL: NO
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
## 2020-01-04 NOTE — ED NOTES
Pt verbally understands d/c papers. All questions answered. No prescriptions given at this time. Pt stable and ambulatory upon discharge.

## 2020-01-04 NOTE — ED PROVIDER NOTES
ED Provider Note    Scribed for Maia Jarquin M.D. by Jodee Shearer. 1/3/2020, 4:13 PM.    Primary care provider: Pcp Pt States None  Means of arrival: EMS  History obtained from: Patient   History limited by: None     CHIEF COMPLAINT  Chief Complaint   Patient presents with   • Possible Stroke       HPI  Rashad Hughes is a 54 y.o. male who presents to the Emergency Department for right sided neck pain and facial numbness onset 2 hours ago. The patient states that he was having lunch with his nieces when the right side of his face and neck felt tingly and tight. He reports associated right arm numbness. Patient has had neck surgery in the past.  He denies headache or leg numbness. He states that he drinks alcohol occasionally, but does not do drugs. He has a history of hypertension and hyperlipidemia.    REVIEW OF SYSTEMS  Pertinent positives include neck pain and arm numbness. Pertinent negatives include no headache or leg numbness.  All other systems reviewed and negative.    PAST MEDICAL HISTORY   has a past medical history of Arthritis, High cholesterol, Hypertension, Pain, Psychiatric problem, Sleep apnea, and Snoring.    SURGICAL HISTORY   has a past surgical history that includes knee arthroscopy (2003. 2005, 2009); other (Bilateral, 1997); otoplasty (Bilateral, 1991); hammertoe correction (Bilateral, 2007); hand surgery (Left, 1995, 2005); shoulder arthroscopy w/ bicipital tenodesis repair (Right, 2004); tonsillectomy (1997); rf tongue base vol reduxn (2002); mass excision general (1/7/2010); cervical disk and fusion anterior (N/A, 4/20/2016); finger arthroplasty (Right, 8/24/2017); and knee arthroplasty total (Left, 4/12/2018).    SOCIAL HISTORY  Social History     Tobacco Use   • Smoking status: Current Every Day Smoker     Types: Cigars   • Smokeless tobacco: Former User   Substance Use Topics   • Alcohol use: Yes     Alcohol/week: 1.0 oz     Types: 2 Standard drinks or equivalent per week      "Comment: 6 per week   • Drug use: No      Social History     Substance and Sexual Activity   Drug Use No       FAMILY HISTORY  Family History   Problem Relation Age of Onset   • Hypertension Unknown        CURRENT MEDICATIONS  Home Medications    **Home medications have not yet been reviewed for this encounter**         ALLERGIES  Allergies   Allergen Reactions   • Shellfish Allergy Anaphylaxis     \"any fish and seafood\"  RXN=whole life       PHYSICAL EXAM  VITAL SIGNS: /82   Pulse 88   Temp 36.9 °C (98.5 °F) (Temporal)   Resp 16   Ht 1.753 m (5' 9\")   Wt 86 kg (189 lb 9.6 oz)   SpO2 94%   BMI 28.00 kg/m²   Constitutional: Alert in no apparent distress.  HENT: No signs of trauma, Bilateral external ears normal, Nose normal.   Eyes: Pupils are equal and reactive, Conjunctiva normal, Non-icteric.   Neck: Normal range of motion, No tenderness, Supple, No stridor.   Cardiovascular: Regular rate and rhythm, no murmurs.   Thorax & Lungs: Normal breath sounds, No respiratory distress, No wheezing, No chest tenderness.   Abdomen: Bowel sounds normal, Soft, No tenderness, No masses, No peritoneal signs.  Skin: Warm, Dry, No erythema, No rash.   Musculoskeletal:  No major deformities noted.  Neurologic: Alert, moving all extremities without difficulty, no focal deficits.  NIH stroke scale of 0    LABS  Labs Reviewed   COMP METABOLIC PANEL - Abnormal; Notable for the following components:       Result Value    Potassium 3.4 (*)     Glucose 118 (*)     All other components within normal limits    Narrative:     Indicate which anticoagulants the patient is on:->UNKNOWN   APTT - Abnormal; Notable for the following components:    APTT 23.1 (*)     All other components within normal limits    Narrative:     Indicate which anticoagulants the patient is on:->UNKNOWN   CBC WITH DIFFERENTIAL    Narrative:     Indicate which anticoagulants the patient is on:->UNKNOWN   PROTHROMBIN TIME    Narrative:     Indicate which " anticoagulants the patient is on:->UNKNOWN   COD (ADULT)   TROPONIN    Narrative:     Indicate which anticoagulants the patient is on:->UNKNOWN   ESTIMATED GFR    Narrative:     Indicate which anticoagulants the patient is on:->UNKNOWN     All labs reviewed by me.    EKG  12 Lead EKG interpreted by me to show:  Normal sinus rhythm  Rate 79   Axis: Normal  Intervals: Normal  Normal T waves  Normal ST segments  My impression of this EKG: Does not indicate ischemia or arrythmia at this time.    RADIOLOGY  MR-BRAIN-W/O   Final Result      1.  No evidence of acute territorial infarct, intracranial hemorrhage or mass lesion.   2.  Mild diffuse cerebral substance loss.   3.  Mild microangiopathic ischemic change versus demyelination or gliosis.   4.  Chronic ischemic pontine gliosis.      DX-CHEST-PORTABLE (1 VIEW)   Final Result      No acute cardiac or pulmonary abnormalities are identified.      CT-CEREBRAL PERFUSION ANALYSIS   Final Result      1.  Cerebral blood flow less than 30% likely representing completed infarct = 0 mL.      2.  T Max more than 6 seconds likely representing combination of completed infarct and ischemia = 0 mL.      3.  Mismatched volume likely representing ischemic brain/penumbra = None      4.  Please note that the cerebral perfusion was performed on the limited brain tissue around the basal ganglia region. Infarct/ischemia outside the CT perfusion sections can be missed in this study.      CT-CTA NECK WITH & W/O-POST PROCESSING   Final Result      1.  CT angiogram of the neck within normal limits.      CT-CTA HEAD WITH & W/O-POST PROCESS   Final Result      1.  CT angiogram of the [place] within normal limits.      CT-HEAD W/O   Final Result      1.  Mild atrophy and chronic microvascular ischemic type changes.   2.  No acute intracranial abnormality.        The radiologist's interpretation of all radiological studies have been reviewed by me.    COURSE & MEDICAL DECISION  "MAKING  Pertinent Labs & Imaging studies reviewed. (See chart for details)    Differential diagnoses include but are not limited to: TIA, brachial plexopathy radiculopathy    4:13 PM - Patient seen and examined at bedside. Discussed plan of care with the patient which includes labs and imaging. Gave the patient the opportunity to ask any questions and he is agreeable to plan of care. Ordered DX-Chest, CT-Head, CT-Cerebral perfusion, CT-CTA head, CT-CTA neck, CBC with differential, CMP, prothrombin time, APTT, COD, troponin, and estimated GFR to evaluate his symptoms.     4:48 PM - Patient was reevaluated at bedside. Patient is not an alteplase candidate because his symptoms have resolved. He has an NIH stroke scale of 0.     6:28 PM - Patient was reevaluated at bedside. Discussed lab and radiology results with the patient and informed them that they were negative for a stroke. Patient informed that he will be discharged home and was given the opportunity to ask any questions. Discussed return precautions and encouraged him to return for any new or worsening symptoms. Patient verbalizes understanding and agreement to this plan of care.       HTN/IDDM FOLLOW UP:  The patient has known hypertension and is being followed by their primary care doctor    Decision Making:  This is a 54 y.o. year old male who presents with right-sided facial numbness.  He was initially activated a code stroke given the onset of symptoms.  However he was evaluated by neurology and his symptoms resolved quickly his NIH stroke scale was 0 and he was not a candidate for alteplase.  He continued to have this right-sided neck and facial \"tightness\" that he described and therefore MRI was performed to rule out stroke.  This was negative.  The patient reported possibly having a syncopal episode however on further questioning it sounds like he was aware of what was around him he just could not answer the questions this does not sound consistent with " a syncopal episode.  I did offer admission for syncope work-up the patient would prefer to be discharged home [facility].  His MRI is negative for any acute stroke labs are otherwise unremarkable and again I do not think he had a true syncopal episode.  I do think he is stable for discharge and outpatient follow-up.    The patient will return for new or worsening symptoms and is stable at the time of discharge. Patient was given return precautions. Patient and/or family member verbalizes understanding and will comply.    DISPOSITION:  Patient will be discharged home in stable condition.    FOLLOW UP:  [facility], Emergency Dept  [address] [facility] [id number] [phone]    Return for worsening weakness, or other worsening symptoms      OUTPATIENT MEDICATIONS:  Discharge Medication List as of 1/3/2020  6:35 PM          FINAL IMPRESSION  1. Facial numbness               This dictation has been created using voice recognition software and/or scribes. The accuracy of the dictation is limited by the abilities of the software and the expertise of the scribes. I expect there may be some errors of grammar and possibly content. I made every attempt to manually correct the errors within my dictation. However, errors related to voice recognition software and/or scribes may still exist and should be interpreted within the appropriate context.     I, Jodee Shearer (Ashokibnoah), am scribing for, and in the presence of, Maia Jarquin M.D..    Electronically signed by: Jodee Shearer (Puneet), 1/3/2020    IMaia M.D. personally performed the services described in this documentation, as scribed by Jodee Shearer in my presence, and it is both accurate and complete. C    The note accurately reflects work and decisions made by me.  Maia Jarquin  1/3/2020  8:22 PM

## 2020-12-17 ENCOUNTER — HOSPITAL ENCOUNTER (EMERGENCY)
Facility: MEDICAL CENTER | Age: 55
End: 2020-12-17
Attending: EMERGENCY MEDICINE
Payer: MEDICARE

## 2020-12-17 ENCOUNTER — APPOINTMENT (OUTPATIENT)
Dept: RADIOLOGY | Facility: MEDICAL CENTER | Age: 55
End: 2020-12-17
Attending: EMERGENCY MEDICINE
Payer: MEDICARE

## 2020-12-17 VITALS
RESPIRATION RATE: 16 BRPM | BODY MASS INDEX: 28.7 KG/M2 | HEART RATE: 78 BPM | WEIGHT: 193.78 LBS | OXYGEN SATURATION: 96 % | TEMPERATURE: 97.8 F | SYSTOLIC BLOOD PRESSURE: 148 MMHG | HEIGHT: 69 IN | DIASTOLIC BLOOD PRESSURE: 79 MMHG

## 2020-12-17 DIAGNOSIS — R10.31 RLQ ABDOMINAL PAIN: ICD-10-CM

## 2020-12-17 LAB
ALBUMIN SERPL BCP-MCNC: 4.7 G/DL (ref 3.2–4.9)
ALBUMIN/GLOB SERPL: 1.8 G/DL
ALP SERPL-CCNC: 84 U/L (ref 30–99)
ALT SERPL-CCNC: 25 U/L (ref 2–50)
ANION GAP SERPL CALC-SCNC: 11 MMOL/L (ref 7–16)
APPEARANCE UR: CLEAR
AST SERPL-CCNC: 18 U/L (ref 12–45)
BASOPHILS # BLD AUTO: 0.6 % (ref 0–1.8)
BASOPHILS # BLD: 0.05 K/UL (ref 0–0.12)
BILIRUB SERPL-MCNC: 0.6 MG/DL (ref 0.1–1.5)
BILIRUB UR QL STRIP.AUTO: NEGATIVE
BUN SERPL-MCNC: 16 MG/DL (ref 8–22)
CALCIUM SERPL-MCNC: 9.5 MG/DL (ref 8.5–10.5)
CHLORIDE SERPL-SCNC: 103 MMOL/L (ref 96–112)
CO2 SERPL-SCNC: 23 MMOL/L (ref 20–33)
COLOR UR: YELLOW
CREAT SERPL-MCNC: 0.89 MG/DL (ref 0.5–1.4)
EOSINOPHIL # BLD AUTO: 0.22 K/UL (ref 0–0.51)
EOSINOPHIL NFR BLD: 2.7 % (ref 0–6.9)
ERYTHROCYTE [DISTWIDTH] IN BLOOD BY AUTOMATED COUNT: 40.2 FL (ref 35.9–50)
GLOBULIN SER CALC-MCNC: 2.6 G/DL (ref 1.9–3.5)
GLUCOSE SERPL-MCNC: 101 MG/DL (ref 65–99)
GLUCOSE UR STRIP.AUTO-MCNC: NEGATIVE MG/DL
HCT VFR BLD AUTO: 46.9 % (ref 42–52)
HGB BLD-MCNC: 16.3 G/DL (ref 14–18)
IMM GRANULOCYTES # BLD AUTO: 0.03 K/UL (ref 0–0.11)
IMM GRANULOCYTES NFR BLD AUTO: 0.4 % (ref 0–0.9)
KETONES UR STRIP.AUTO-MCNC: NEGATIVE MG/DL
LEUKOCYTE ESTERASE UR QL STRIP.AUTO: NEGATIVE
LIPASE SERPL-CCNC: 55 U/L (ref 11–82)
LYMPHOCYTES # BLD AUTO: 2.13 K/UL (ref 1–4.8)
LYMPHOCYTES NFR BLD: 26.6 % (ref 22–41)
MCH RBC QN AUTO: 30.7 PG (ref 27–33)
MCHC RBC AUTO-ENTMCNC: 34.8 G/DL (ref 33.7–35.3)
MCV RBC AUTO: 88.3 FL (ref 81.4–97.8)
MICRO URNS: NORMAL
MONOCYTES # BLD AUTO: 0.85 K/UL (ref 0–0.85)
MONOCYTES NFR BLD AUTO: 10.6 % (ref 0–13.4)
NEUTROPHILS # BLD AUTO: 4.73 K/UL (ref 1.82–7.42)
NEUTROPHILS NFR BLD: 59.1 % (ref 44–72)
NITRITE UR QL STRIP.AUTO: NEGATIVE
NRBC # BLD AUTO: 0 K/UL
NRBC BLD-RTO: 0 /100 WBC
PH UR STRIP.AUTO: 6.5 [PH] (ref 5–8)
PLATELET # BLD AUTO: 291 K/UL (ref 164–446)
PMV BLD AUTO: 11.7 FL (ref 9–12.9)
POTASSIUM SERPL-SCNC: 3.8 MMOL/L (ref 3.6–5.5)
PROT SERPL-MCNC: 7.3 G/DL (ref 6–8.2)
PROT UR QL STRIP: NEGATIVE MG/DL
RBC # BLD AUTO: 5.31 M/UL (ref 4.7–6.1)
RBC UR QL AUTO: NEGATIVE
SODIUM SERPL-SCNC: 137 MMOL/L (ref 135–145)
SP GR UR REFRACTOMETRY: 1.03
UROBILINOGEN UR STRIP.AUTO-MCNC: 1 MG/DL
WBC # BLD AUTO: 8 K/UL (ref 4.8–10.8)

## 2020-12-17 PROCEDURE — 700117 HCHG RX CONTRAST REV CODE 255: Performed by: EMERGENCY MEDICINE

## 2020-12-17 PROCEDURE — 700105 HCHG RX REV CODE 258: Performed by: EMERGENCY MEDICINE

## 2020-12-17 PROCEDURE — 74177 CT ABD & PELVIS W/CONTRAST: CPT

## 2020-12-17 PROCEDURE — 81003 URINALYSIS AUTO W/O SCOPE: CPT

## 2020-12-17 PROCEDURE — 80053 COMPREHEN METABOLIC PANEL: CPT

## 2020-12-17 PROCEDURE — 36415 COLL VENOUS BLD VENIPUNCTURE: CPT

## 2020-12-17 PROCEDURE — 85025 COMPLETE CBC W/AUTO DIFF WBC: CPT

## 2020-12-17 PROCEDURE — 83690 ASSAY OF LIPASE: CPT

## 2020-12-17 PROCEDURE — 99284 EMERGENCY DEPT VISIT MOD MDM: CPT

## 2020-12-17 RX ORDER — SODIUM CHLORIDE 9 MG/ML
1000 INJECTION, SOLUTION INTRAVENOUS ONCE
Status: COMPLETED | OUTPATIENT
Start: 2020-12-17 | End: 2020-12-17

## 2020-12-17 RX ADMIN — SODIUM CHLORIDE 1000 ML: 9 INJECTION, SOLUTION INTRAVENOUS at 17:23

## 2020-12-17 RX ADMIN — IOHEXOL 100 ML: 350 INJECTION, SOLUTION INTRAVENOUS at 17:59

## 2020-12-17 ASSESSMENT — LIFESTYLE VARIABLES: DO YOU DRINK ALCOHOL: NO

## 2020-12-17 ASSESSMENT — FIBROSIS 4 INDEX: FIB4 SCORE: 0.71

## 2020-12-18 NOTE — ED TRIAGE NOTES
"Chief Complaint   Patient presents with   • RLQ Pain     x6 days, getting worse. radiates down. denies N/V/D. denies fevers.    • Sent by MD     sent for r/o appendicitis     Pt to triage for above. Appears uncomfortable. protocol ordered.     Denies covid s/sx, denies travel or contact with it.     Pt returned to lobby. Educated on triage process and to inform staff of any changes.     /89   Pulse (!) 101   Temp 36.2 °C (97.2 °F) (Temporal)   Resp 16   Ht 1.753 m (5' 9\")   Wt 87.9 kg (193 lb 12.6 oz)   SpO2 97%   BMI 28.62 kg/m²     "

## 2020-12-18 NOTE — DISCHARGE INSTRUCTIONS
You were seen in the Emergency Department for abdominal pain.    Labs, CT scan, urinalysis were completed without significant acute abnormalities.    Please use 1,000mg of tylenol or 600mg of ibuprofen every 6 hours as needed for pain.  Restart taking probiotics have helped in the past.    Please follow up with your primary care physician.    Return to the Emergency Department with fevers, worsening pain, vomiting, or other concerns.

## 2020-12-18 NOTE — ED NOTES
Pt seen by ERP. IV started, labs sent.  IV fluids started.  Pt remains unable to provide urine sample at this time.

## 2020-12-18 NOTE — ED PROVIDER NOTES
"ED Provider Note    Scribed for Camila Umanzor M.D. by Kevin Carrasco. 12/17/2020  4:45 PM    Means of arrival: Walk in  History obtained from: Patient  History limited by: None      CHIEF COMPLAINT  Chief Complaint   Patient presents with   • RLQ Pain     x6 days, getting worse. radiates down. denies N/V/D. denies fevers.    • Sent by MD     sent for r/o appendicitis       HPI  Rashad Hughes is a 55 y.o. male who presents to the Emergency Department for for worsening right lower quadrant pain onset 5 days ago. The patient states that the pain has become so severe that he is unable to sleep. He descries the region as \"hot\" and states that the pain is worsened when he moves around. He called his PCP who recommended that he come in due to concern of appendicitis. At this time patient endorses diarrhea, but denies any dysuria, hematuria, fever, nausea, or vomiting. No history of abdominal surgery. Patient has history of kidney stones and denies that this pain is similar. No alleviating factors were stated.     REVIEW OF SYSTEMS  Pertinent positive include right lower quadrant pain and diarrhea. Pertinent negative include dysuria, hematuria, fever, nausea, or vomiting. All other systems reviewed and are negative.    PAST MEDICAL HISTORY   has a past medical history of Arthritis, High cholesterol, Hypertension, Pain, Psychiatric problem, Sleep apnea, and Snoring.    SOCIAL HISTORY  Social History     Tobacco Use   • Smoking status: Former Smoker     Types: Cigars   • Smokeless tobacco: Former User   Substance and Sexual Activity   • Alcohol use: Yes     Alcohol/week: 1.0 oz     Types: 2 Standard drinks or equivalent per week     Comment: 6 per week   • Drug use: No   • Sexual activity: Not on file       SURGICAL HISTORY   has a past surgical history that includes knee arthroscopy (2003. 2005, 2009); other (Bilateral, 1997); otoplasty (Bilateral, 1991); hammertoe correction (Bilateral, 2007); hand surgery (Left, " "1995, 2005); shoulder arthroscopy w/ bicipital tenodesis repair (Right, 2004); tonsillectomy (1997); rf tongue base vol reduxn (2002); mass excision general (1/7/2010); cervical disk and fusion anterior (N/A, 4/20/2016); finger arthroplasty (Right, 8/24/2017); and knee arthroplasty total (Left, 4/12/2018).    CURRENT MEDICATIONS  Home Medications     Reviewed by Charli Vasquez R.N. (Registered Nurse) on 12/17/20 at 1615  Med List Status: Partial   Medication Last Dose Status   amlodipine (NORVASC) 10 MG Tab  Active   aspirin 81 MG tablet  Active   atorvastatin (LIPITOR) 10 MG Tab  Active   Cholecalciferol 19145 units Tab  Active   ibuprofen (MOTRIN) 800 MG Tab  Active   lisinopril (PRINIVIL, ZESTRIL) 40 MG tablet  Active   loratadine (CLARITIN) 10 MG Tab  Active   paroxetine (PAXIL) 40 MG tablet  Active                ALLERGIES  Allergies   Allergen Reactions   • Shellfish Allergy Anaphylaxis     \"any fish and seafood\"  RXN=whole life       PHYSICAL EXAM   VITAL SIGNS: /89   Pulse (!) 101   Temp 36.2 °C (97.2 °F) (Temporal)   Resp 16   Ht 1.753 m (5' 9\")   Wt 87.9 kg (193 lb 12.6 oz)   SpO2 97%   BMI 28.62 kg/m²    Constitutional: Nontoxic appearing middle-aged male. Alert in no apparent distress.  HENT: Normocephalic, Atraumatic. Bilateral external ears normal. Nose normal.  Moist mucous membranes.  Oropharynx clear.  Eyes: Pupils are equal and reactive. Conjunctiva normal.   Neck: Supple, full range of motion  Heart: Regular rate and rhythm.  No murmurs.    Lungs: No respiratory distress, normal work of breathing. Lungs clear to auscultation bilaterally.  Abdomen Right lower quadrant tenderness. No rebound or guarding. Soft, no distention  Musculoskeletal: Atraumatic. No obvious deformities noted.  No lower extremity edema.  Skin: Warm, Dry.  No erythema, No rash.   Neurologic: Alert and oriented x3. Moving all extremities spontaneously without focal deficits.  Psychiatric: Affect normal, Mood normal, " Appears appropriate and not intoxicated.    DIAGNOSTIC STUDIES    LABS  Personally reviewed by me  Labs Reviewed   COMP METABOLIC PANEL - Abnormal; Notable for the following components:       Result Value    Glucose 101 (*)     All other components within normal limits   CBC WITH DIFFERENTIAL   LIPASE   URINALYSIS,CULTURE IF INDICATED    Narrative:     Indication for culture:->Patient WITHOUT an indwelling Funk  catheter in place with new onset of Dysuria, Frequency,  Urgency, and/or Suprapubic pain   ESTIMATED GFR   REFRACTOMETER SG    Narrative:     Indication for culture:->Patient WITHOUT an indwelling Funk  catheter in place with new onset of Dysuria, Frequency,  Urgency, and/or Suprapubic pain          RADIOLOGY  Personally reviewed by me  CT-ABDOMEN-PELVIS WITH   Final Result      No acute abnormality in the abdomen or pelvis. Normal appendix.           ED COURSE  Vitals:    12/17/20 1613 12/17/20 1901 12/17/20 1909 12/17/20 1941   BP:  148/79     Pulse:  80 78    Resp:   16    Temp:    36.6 °C (97.8 °F)   TempSrc:    Oral   SpO2:  95% 96%    Weight: 87.9 kg (193 lb 12.6 oz)      Height:             Medications administered:  Medications   NS infusion 1,000 mL (0 mL Intravenous Stopped 12/17/20 1924)   iohexol (OMNIPAQUE) 350 mg/mL (100 mL Intravenous Given 12/17/20 1759)       Patient was given IV fluids for possible dehydratio.  IV hydration was used because oral hydration was not adequate alone.  Following fluid administration patient's symptoms and hydration status were improved.     4:45 PM Patient seen and examined at bedside. The patient presents with right lower quadrant tenderness. Ordered for UA, lipase, CMP, CBC with differential, estimated GFR, ct-abdomen to evaluate. Patient will be treated with 1L of NS for his symptoms.       MEDICAL DECISION MAKING  Relatively healthy patient who presents with 5-day history of right lower quadrant abdominal pain.  He is afebrile, mildly tachycardic on arrival  with otherwise reassuring vital signs.  Patient's labs are reassuring without leukocytosis.  There is no transaminitis or elevated lipase concerning for pancreatitis.  Urinalysis is negative for infection or significant blood.  Imaging was performed without evidence of appendicitis, diverticulitis, nephrolithiasis.  The patient does state that he has recently stopped taking his normal probiotics and does have some alternating diarrhea and constipation therefore may have irritable bowel disease.  I am not seeing any other acute abnormality and feel he can be discharged home with symptomatic care and outpatient follow-up.    7:28 PM - Upon reassessment, patient is resting comfortably with normal vital signs.  No new complaints at this time.  Discussed results with patient and/or family as well as importance of primary care follow up.  Patient understands plan of care and strict return precautions for new or changing symptoms.          The patient will return for new or worsening symptoms and is stable at the time of discharge.    DISPOSITION:  Patient will be discharged home in stable condition.    FOLLOW UP:  Marion Moss M.D.  [address] [place] [id number]  [phone]    Call   to establish primary care physician    [facility], Emergency Dept  [address] [facility] [id number] [phone]    If symptoms worsen      OUTPATIENT MEDICATIONS:  Discharge Medication List as of 12/17/2020  7:32 PM           IMPRESSION  (R10.31) RLQ abdominal pain    Results, diagnoses, and treatment options were discussed with the patient and/or family. Patient verbalized understanding of plan of care.    Discharge Medication List as of 12/17/2020  7:32 PM               Kevin FRIEND), am scribing for, and in the presence of, Camila Umanzor M.D..    Electronically signed by: Kevin Rodriguez), 12/17/2020    Camila FRIEND M.D. personally performed the services  described in this documentation, as scribed by Kevin Carrasco in my presence, and it is both accurate and complete.  C  The note accurately reflects work and decisions made by me.  Camila Umanzor M.D.  12/18/2020  12:21 AM

## 2021-03-15 DIAGNOSIS — Z23 NEED FOR VACCINATION: ICD-10-CM

## 2024-12-09 ENCOUNTER — APPOINTMENT (OUTPATIENT)
Dept: RADIOLOGY | Facility: MEDICAL CENTER | Age: 59
End: 2024-12-09
Attending: EMERGENCY MEDICINE
Payer: MEDICARE

## 2024-12-09 ENCOUNTER — HOSPITAL ENCOUNTER (EMERGENCY)
Facility: MEDICAL CENTER | Age: 59
End: 2024-12-10
Attending: EMERGENCY MEDICINE
Payer: MEDICARE

## 2024-12-09 DIAGNOSIS — K40.90 RIGHT INGUINAL HERNIA: ICD-10-CM

## 2024-12-09 DIAGNOSIS — S30.22XA CONTUSION OF SCROTUM, INITIAL ENCOUNTER: ICD-10-CM

## 2024-12-09 PROCEDURE — 99283 EMERGENCY DEPT VISIT LOW MDM: CPT

## 2024-12-09 PROCEDURE — 76870 US EXAM SCROTUM: CPT

## 2024-12-09 ASSESSMENT — PAIN DESCRIPTION - PAIN TYPE: TYPE: ACUTE PAIN

## 2024-12-10 VITALS
OXYGEN SATURATION: 95 % | HEART RATE: 81 BPM | BODY MASS INDEX: 29.71 KG/M2 | HEIGHT: 69 IN | TEMPERATURE: 98.3 F | RESPIRATION RATE: 17 BRPM | WEIGHT: 200.62 LBS | SYSTOLIC BLOOD PRESSURE: 144 MMHG | DIASTOLIC BLOOD PRESSURE: 97 MMHG

## 2024-12-10 NOTE — ED TRIAGE NOTES
Rashad Hughes  59 y.o. male  Chief Complaint   Patient presents with    Testicle Pain     Penis shaft , Bilateral testicle pain, bruising and swelling since Saturday. Right testicle bigger then left.        Pt ambulatory to triage with steady gait for above complaint.     Pt is GCS 15, speaking in full sentences, follows commands and responds appropriately to questions. Resp are even and unlabored.     Pt placed in ED lobby. Pt educated on triage process. Pt encouraged to alert staff for any changes.       Vitals:    12/09/24 2007   BP: (!) 159/105   Pulse: 88   Resp: 17   Temp: 36.6 °C (97.8 °F)   SpO2: 95%

## 2024-12-10 NOTE — ED NOTES
Pt understands DC instructions and follow-up, DC paperwork provided, pt ambulated with steady gait to [facility] for DC

## 2024-12-10 NOTE — ED PROVIDER NOTES
ED Provider Note    CHIEF COMPLAINT  Chief Complaint   Patient presents with    Testicle Pain     Penis shaft , Bilateral testicle pain, bruising and swelling since Saturday. Right testicle bigger then left.          EXTERNAL RECORDS REVIEWED  Outpatient orthopedic note 11/21/2024 seen for left little finger MCP sprain.    HPI/ROS  LIMITATION TO HISTORY   Select: : None  OUTSIDE HISTORIAN(S):  None    Rashad Hughes is a 59 y.o. male who presents to the ER with scrotal pain and bruising/swelling.  This occurred after sexual intercourse injury 2 days ago.  He has had significant ecchymosis of the scrotum spreading up the penis and into the peroneum.  Most of his pain is on the right side of the scrotum/testicle.  Feels that the testicle is enlarged.  He has not had an erection since this happened.  No difficulty with urinating, no hematuria, no dysuria.    PAST MEDICAL HISTORY   has a past medical history of Arthritis, High cholesterol, Hypertension, Pain, Psychiatric problem, Sleep apnea, and Snoring.    SURGICAL HISTORY   has a past surgical history that includes knee arthroscopy (2003. 2005, 2009); other (Bilateral, 1997); otoplasty (Bilateral, 1991); hammertoe correction (Bilateral, 2007); hand surgery (Left, 1995, 2005); shoulder arthroscopy w/ bicipital tenodesis repair (Right, 2004); tonsillectomy (1997); rf tongue base vol reduxn (2002); mass excision general (1/7/2010); cervical disk and fusion anterior (N/A, 4/20/2016); finger arthroplasty (Right, 8/24/2017); and knee arthroplasty total (Left, 4/12/2018).    FAMILY HISTORY  Family History   Problem Relation Age of Onset    Hypertension Other        SOCIAL HISTORY  Social History     Tobacco Use    Smoking status: Former     Types: Cigars    Smokeless tobacco: Former   Substance and Sexual Activity    Alcohol use: Yes     Alcohol/week: 1.0 oz     Types: 2 Standard drinks or equivalent per week     Comment: 6 per week    Drug use: No    Sexual activity:  "Not on file       CURRENT MEDICATIONS  Home Medications       Reviewed by Chai Almonte R.N. (Registered Nurse) on 12/09/24 at 2017  Med List Status: Partial     Medication Last Dose Status   amlodipine (NORVASC) 10 MG Tab  Active   aspirin 81 MG tablet  Active   atorvastatin (LIPITOR) 10 MG Tab  Active   Cholecalciferol 71962 units Tab  Active   ibuprofen (MOTRIN) 800 MG Tab  Active   lisinopril (PRINIVIL, ZESTRIL) 40 MG tablet  Active   loratadine (CLARITIN) 10 MG Tab  Active   paroxetine (PAXIL) 40 MG tablet  Active                    ALLERGIES  Allergies   Allergen Reactions    Shellfish Allergy Anaphylaxis     \"any fish and seafood\"  RXN=whole life       PHYSICAL EXAM  VITAL SIGNS: BP (!) 144/97   Pulse 81   Temp 36.8 °C (98.3 °F) (Temporal)   Resp 17   Ht 1.753 m (5' 9\")   Wt 91 kg (200 lb 9.9 oz)   SpO2 95%   BMI 29.63 kg/m²    General: Laying calmly in stretcher, no distress  : Significant scrotal ecchymosis worse on the right with tenderness and scrotal swelling on the right.  Ecchymosis slightly extending up the base of the penile shaft on the right.  No significant tenderness or deformity of the penis.  Ecchymosis slightly expanding to the perineum      RADIOLOGY/PROCEDURES   I have independently interpreted the diagnostic imaging associated with this visit and am waiting the final reading from the radiologist.   My preliminary interpretation is as follows: No testicular injury, right inguinal hernia incidentally noted, right scrotal wall thickening in the area of ecchymosis    Radiologist interpretation:  [id number]   Final Result         1.  Normal scrotum ultrasound.   2.  Small bilateral hydroceles, right greater than left, Specular debris   3.  Right inguinal hernia   4.  Right scrotal wall thickening, with reported ecchymosis.          COURSE & MEDICAL DECISION MAKING    ASSESSMENT, COURSE AND PLAN  Care Narrative: Differential includes testicular rupture, penis fracture, urethral " injury, hernia, Tania's gangrene, scrotal hematoma    On arrival the patient is well-appearing, some hypertension but overall well-appearing.  Has significant ecchymosis throughout the scrotum worse on the right that slightly extends at the base of the penis and into the perineum.  Tender to palpation throughout the ecchymosis and feels to have a slightly enlarged right testicle/scrotum.  Differential above considered.  I suspect that his injury is likely scrotal/testicular nature, lower concern for penile injury or urethral injury at this time, not having any pain in the penis or difficulty urinating.  Suspect the ecchymosis is just tracking throughout the fascial planes.  We will obtain an ultrasound.  This demonstrated bilateral hydroceles right greater than left with right scrotal wall thickening and a right inguinal hernia which is likely causing the worsening swelling on the right.  Low concern for incarcerated or strangulated hernia at this time, suspect ecchymosis is traumatic in nature as symptoms started after injury during intercourse.  Again, low concern for penile injury at this time.  Testicles themselves appear normal on ultrasound.  Recommended patient [facility] to get [facility] general [facility] to consider elective inguinal hernia repair if indicated.  I sent a referral to the urology clinic should his scrotal symptoms not get better over the next week or 2.  Return precautions provided and he was discharged home in stable condition            Escalation of care considered, and ultimately not performed:acute inpatient care management, however at this time, the patient is most appropriate for outpatient management    Barriers to care at this time, including but not limited to: None.     Decision tools and prescription drugs considered including, but not limited to: None will use over-the-counter pain meds.    FINAL DIAGNOSIS  1. Contusion of scrotum, initial encounter    2.  Right inguinal hernia         Electronically signed by: Stevan Walker M.D., 12/9/2024 10:10 PM

## 2024-12-10 NOTE — DISCHARGE INSTRUCTIONS
Your ultrasound today showed bruising of the scrotum but no problems with the testicle.  I placed a referral to urology, you can see them if you are still having a lot of pain in a week or 2 from now, or if you are getting painful erections or having difficulty with your urinary stream.  If pain is getting significantly worse come back to the ER.  Regarding your inguinal hernia I would [facility] to [facility] to have this electively repaired if appropriate.

## (undated) DEVICE — CANISTER SUCTION RIGID RED 1500CC (40EA/CA)

## (undated) DEVICE — SENSOR SPO2 NEO LNCS ADHESIVE (20/BX) SEE USER NOTES

## (undated) DEVICE — BLADE SAGITTAL 6 SYSTEM 25MM

## (undated) DEVICE — SUCTION INSTRUMENT YANKAUER BULBOUS TIP W/O VENT (50EA/CA)

## (undated) DEVICE — DRAPE C-ARM LARGE 41IN X 74 IN - (10/BX 2BX/CA)

## (undated) DEVICE — CHLORAPREP 26 ML APPLICATOR - ORANGE TINT(25/CA)

## (undated) DEVICE — GLOVE BIOGEL INDICATOR SZ 8 SURGICAL PF LTX - (50/BX 4BX/CA)

## (undated) DEVICE — HEAD HOLDER JUNIOR/ADULT

## (undated) DEVICE — LACTATED RINGERS INJ 1000 ML - (14EA/CA 60CA/PF)

## (undated) DEVICE — GOWN WARMING STANDARD FLEX - (30/CA)

## (undated) DEVICE — SUTURE GENERAL

## (undated) DEVICE — KIT ROOM DECONTAMINATION

## (undated) DEVICE — SUTURE 1 VICRYL PLUS CTX - 8 X 18 INCH (12/BX)

## (undated) DEVICE — STOCKINETTE IMPERVIOUS 12X48 - STERILELF (10/CA)"

## (undated) DEVICE — MASK, LARYNGEAL AIRWAY #4

## (undated) DEVICE — PACK TOTAL KNEE  (1/CA)

## (undated) DEVICE — GLOVE BIOGEL INDICATOR SZ 7SURGICAL PF LTX - (50/BX 4BX/CA)

## (undated) DEVICE — PROTECTOR ULNA NERVE - (36PR/CA)

## (undated) DEVICE — MASK ANESTHESIA ADULT  - (100/CA)

## (undated) DEVICE — PIN TROCAR GEN 1/8X3 (4EA/BX)

## (undated) DEVICE — SODIUM CHL IRRIGATION 0.9% 1000ML (12EA/CA)

## (undated) DEVICE — ELECTRODE 850 FOAM ADHESIVE - HYDROGEL RADIOTRNSPRNT (50/PK)

## (undated) DEVICE — DRAPE LARGE 3 QUARTER - (20/CA)

## (undated) DEVICE — GOWN SURGICAL XX-LARGE - (28EA/CA) SIRUS NON REINFORCED

## (undated) DEVICE — HANDPIECE 10FT INTPLS SCT PLS IRRIGATION HAND CONTROL SET (6/PK)

## (undated) DEVICE — NEEDLE W/FACET TIP DULL VERSION W/STIMULATION CABLE SONOPLEX 21G X 4 (10/EA)"

## (undated) DEVICE — GLOVE BIOGEL SZ 8 SURGICAL PF LTX - (50PR/BX 4BX/CA)

## (undated) DEVICE — BLADE SAGITTAL DUAL 25MM

## (undated) DEVICE — Device

## (undated) DEVICE — BLADE MICRO SAW 25MM X 5.5MM

## (undated) DEVICE — KIT ANESTHESIA W/CIRCUIT & 3/LT BAG W/FILTER (20EA/CA)

## (undated) DEVICE — NEPTUNE 4 PORT MANIFOLD - (20/PK)

## (undated) DEVICE — SYS BN CMNT HI VAC KT MXR BWL - (MIX-E-VAC II)  (10EA/CA)

## (undated) DEVICE — BAG, SPONGE COUNT 50600

## (undated) DEVICE — ELECTRODE DUAL RETURN W/ CORD - (50/PK)

## (undated) DEVICE — GLOVE, LITE (PAIR)

## (undated) DEVICE — SUT NABSB 4-0 SPRMD MFL NYL (12PK/BX)

## (undated) DEVICE — HUMID-VENT HEAT AND MOISTURE EXCHANGE- (50/BX)

## (undated) DEVICE — BLOCK

## (undated) DEVICE — PADDING CAST 4 IN STERILE - 4 X 4 YDS (24/CA)

## (undated) DEVICE — TOURNIQUET CUFF 34 X 4 ONE PORT DISP - STERILE (10/BX)

## (undated) DEVICE — TIP INTPLS HFLO ML ORFC BTRY - (12/CS)  FOR SURGILAV

## (undated) DEVICE — WATER IRRIGATION STERILE 1000ML (12EA/CA)

## (undated) DEVICE — BANDAGE ELASTIC STERILE VELCRO 6 X 5 YDS (25EA/CA)

## (undated) DEVICE — LENS/HOOD FOR SPACESUIT - (32/PK) PEEL AWAY FACE

## (undated) DEVICE — SODIUM CHL. IRRIGATION 0.9% 3000ML (4EA/CA 65CA/PF)

## (undated) DEVICE — STOCKINET BIAS 4 IN STERILE - (20/CA)

## (undated) DEVICE — GLOVE BIOGEL SZ 7 SURGICAL PF LTX - (50PR/BX 4BX/CA)

## (undated) DEVICE — GLOVE SZ 7.5 LF PROTEXIS (50PR/BX)

## (undated) DEVICE — SUTURE 4-0 ETHILON PS-2 18 (12PK/BX)"

## (undated) DEVICE — SUTURE 2-0 MONOCRYL SH&UR-6 27 - (12/BX)

## (undated) DEVICE — SUTURE 3-0 VICRYL PLUS RB-1 - 8 X 18 INCH (12/BX)

## (undated) DEVICE — GLOVE BIOGEL INDICATOR SZ 7.5 SURGICAL PF LTX - (50PR/BX 4BX/CA)

## (undated) DEVICE — TUBE CONNECTING SUCTION - CLEAR PLASTIC STERILE 72 IN (50EA/CA)

## (undated) DEVICE — PACK UPPER EXTREMITY SM OR - (3/CA)

## (undated) DEVICE — BLADE SAGITTAL ZS-039 - ZS-039

## (undated) DEVICE — SPLINT PLASTER 4 IN  X 15 IN - (50/BX 12BX/CA)